# Patient Record
Sex: FEMALE | Race: WHITE
[De-identification: names, ages, dates, MRNs, and addresses within clinical notes are randomized per-mention and may not be internally consistent; named-entity substitution may affect disease eponyms.]

---

## 2017-06-05 ENCOUNTER — HOSPITAL ENCOUNTER (OUTPATIENT)
Dept: HOSPITAL 58 - RAD | Age: 67
Discharge: HOME | End: 2017-06-05
Attending: INTERNAL MEDICINE

## 2017-06-05 DIAGNOSIS — Z12.31: Primary | ICD-10-CM

## 2017-08-14 ENCOUNTER — HOSPITAL ENCOUNTER (OUTPATIENT)
Dept: HOSPITAL 58 - LAB | Age: 67
Discharge: HOME | End: 2017-08-14
Attending: INTERNAL MEDICINE

## 2017-08-14 DIAGNOSIS — I10: ICD-10-CM

## 2017-08-14 DIAGNOSIS — E78.5: Primary | ICD-10-CM

## 2017-08-14 LAB
ALBUMIN SERPL-MCNC: 3.7 G/DL (ref 3.4–5)
ALBUMIN/GLOB SERPL: 0.95 {RATIO}
ALP SERPL-CCNC: 82 U/L (ref 53–141)
ALT SERPL-CCNC: 22 U/L (ref 12–78)
ANION GAP SERPL CALC-SCNC: 13.6 MMOL/L
AST SERPL-CCNC: 14 U/L (ref 15–37)
BASOPHILS # BLD AUTO: 0.1 K/UL (ref 0–0.2)
BASOPHILS NFR BLD AUTO: 0.6 % (ref 0–3)
BILIRUB SERPL-MCNC: 0.75 MG/DL (ref 0–1.2)
BUN SERPL-MCNC: 10 MG/DL (ref 7–18)
BUN/CREAT SERPL: 10.52
CALCIUM SERPL-MCNC: 10 MG/DL (ref 8.2–10.2)
CHLORIDE SERPL-SCNC: 105 MMOL/L (ref 98–107)
CHOLEST SERPL-MCNC: 153 MG/DL (ref 0–200)
CHOLEST/HDLC SERPL: 3.2 {RATIO} (ref 4.5–5.5)
CO2 BLD-SCNC: 27 MMOL/L (ref 23–31)
CREAT SERPL-MCNC: 0.95 MG/DL (ref 0.6–1.3)
EOSINOPHIL # BLD AUTO: 0.2 K/UL (ref 0–0.7)
EOSINOPHIL NFR BLD AUTO: 2.1 % (ref 0–7)
GFR SERPLBLD BASED ON 1.73 SQ M-ARVRAT: 58 ML/MIN
GLOBULIN SER CALC-MCNC: 3.9 G/L
GLUCOSE SERPL-MCNC: 155 MG/DL (ref 82–115)
HCT VFR BLD AUTO: 41.2 % (ref 37–47)
HDLC SERPL-MCNC: 48 MG/DL (ref 35–80)
HGB BLD-MCNC: 13.4 G/DL (ref 12–16)
IMM GRANULOCYTES NFR BLD AUTO: 0.5 % (ref 0–5)
LYMPHOCYTES # SPEC AUTO: 1.4 K/UL (ref 0.6–3.4)
LYMPHOCYTES NFR BLD AUTO: 15.6 % (ref 10–50)
MCH RBC QN: 27.1 PG (ref 27–31)
MCHC RBC AUTO-ENTMCNC: 32.5 G/DL (ref 31.8–35.4)
MCV RBC: 83.2 FL (ref 81–99)
MONOCYTES # BLD AUTO: 0.6 K/UL (ref 0.4–2)
MONOCYTES NFR BLD AUTO: 6.6 % (ref 0–10)
NEUTROPHILS # BLD AUTO: 6.6 K/UL (ref 2–6.9)
NEUTROPHILS NFR BLD AUTO: 74.6 %
PLATELET # BLD AUTO: 284 10^3/UL (ref 140–440)
POTASSIUM SERPL-SCNC: 3.6 MMOL/L (ref 3.5–5.1)
PROT SERPL-MCNC: 7.6 G/DL (ref 5.8–8.1)
RBC # BLD AUTO: 4.95 10^6/UL (ref 4.2–5.4)
SODIUM SERPL-SCNC: 142 MMOL/L (ref 136–145)
TRIGL SERPL-MCNC: 141 MG/DL (ref 30–150)
VLDLC SERPL CALC-MCNC: 28 MG/DL (ref 2–30)
WBC # BLD AUTO: 8.85 K/UL (ref 4.6–10.2)

## 2017-08-14 PROCEDURE — 80053 COMPREHEN METABOLIC PANEL: CPT

## 2017-08-14 PROCEDURE — 80061 LIPID PANEL: CPT

## 2017-08-14 PROCEDURE — 85025 COMPLETE CBC W/AUTO DIFF WBC: CPT

## 2017-08-14 PROCEDURE — 84443 ASSAY THYROID STIM HORMONE: CPT

## 2017-08-14 PROCEDURE — 36415 COLL VENOUS BLD VENIPUNCTURE: CPT

## 2017-11-13 ENCOUNTER — HOSPITAL ENCOUNTER (OUTPATIENT)
Dept: HOSPITAL 58 - CAR | Age: 67
Discharge: HOME | End: 2017-11-13
Attending: INTERNAL MEDICINE

## 2017-11-13 DIAGNOSIS — E78.5: Primary | ICD-10-CM

## 2017-11-13 DIAGNOSIS — G47.30: ICD-10-CM

## 2017-11-13 DIAGNOSIS — I10: ICD-10-CM

## 2017-11-13 LAB
ALBUMIN SERPL-MCNC: 3.7 G/DL (ref 3.4–5)
ALBUMIN/GLOB SERPL: 0.86 {RATIO}
ALP SERPL-CCNC: 74 U/L (ref 53–141)
ALT SERPL-CCNC: 20 U/L (ref 12–78)
ANION GAP SERPL CALC-SCNC: 13.7 MMOL/L
AST SERPL-CCNC: 15 U/L (ref 15–37)
BASOPHILS # BLD AUTO: 0.1 K/UL (ref 0–0.2)
BASOPHILS NFR BLD AUTO: 0.5 % (ref 0–3)
BILIRUB SERPL-MCNC: 0.85 MG/DL (ref 0–1.2)
BUN SERPL-MCNC: 13 MG/DL (ref 7–18)
BUN/CREAT SERPL: 14.13
CALCIUM SERPL-MCNC: 10.1 MG/DL (ref 8.2–10.2)
CHLORIDE SERPL-SCNC: 105 MMOL/L (ref 98–107)
CHOLEST SERPL-MCNC: 157 MG/DL (ref 0–200)
CHOLEST/HDLC SERPL: 3.4 {RATIO} (ref 4.5–5.5)
CO2 BLD-SCNC: 27 MMOL/L (ref 23–31)
CREAT SERPL-MCNC: 0.92 MG/DL (ref 0.6–1.3)
EOSINOPHIL # BLD AUTO: 0.1 K/UL (ref 0–0.7)
EOSINOPHIL NFR BLD AUTO: 1.2 % (ref 0–7)
GFR SERPLBLD BASED ON 1.73 SQ M-ARVRAT: 61 ML/MIN
GLOBULIN SER CALC-MCNC: 4.3 G/L
GLUCOSE SERPL-MCNC: 115 MG/DL (ref 82–115)
HCT VFR BLD AUTO: 41.7 % (ref 37–47)
HDLC SERPL-MCNC: 46 MG/DL (ref 35–80)
HGB BLD-MCNC: 13.9 G/DL (ref 12–16)
IMM GRANULOCYTES NFR BLD AUTO: 0.6 % (ref 0–5)
LYMPHOCYTES # SPEC AUTO: 1.5 K/UL (ref 0.6–3.4)
LYMPHOCYTES NFR BLD AUTO: 13.8 % (ref 10–50)
MCH RBC QN: 27.6 PG (ref 27–31)
MCHC RBC AUTO-ENTMCNC: 33.3 G/DL (ref 31.8–35.4)
MCV RBC: 82.9 FL (ref 81–99)
MONOCYTES # BLD AUTO: 0.8 K/UL (ref 0.4–2)
MONOCYTES NFR BLD AUTO: 7.1 % (ref 0–10)
NEUTROPHILS # BLD AUTO: 8.4 K/UL (ref 2–6.9)
NEUTROPHILS NFR BLD AUTO: 76.8 %
PLATELET # BLD AUTO: 278 10^3/UL (ref 140–440)
POTASSIUM SERPL-SCNC: 3.7 MMOL/L (ref 3.5–5.1)
PROT SERPL-MCNC: 8 G/DL (ref 5.8–8.1)
RBC # BLD AUTO: 5.03 10^6/UL (ref 4.2–5.4)
SODIUM SERPL-SCNC: 142 MMOL/L (ref 136–145)
TRIGL SERPL-MCNC: 130 MG/DL (ref 30–150)
VLDLC SERPL CALC-MCNC: 26 MG/DL (ref 2–30)
WBC # BLD AUTO: 10.88 K/UL (ref 4.6–10.2)

## 2017-11-13 PROCEDURE — 80053 COMPREHEN METABOLIC PANEL: CPT

## 2017-11-13 PROCEDURE — 36415 COLL VENOUS BLD VENIPUNCTURE: CPT

## 2017-11-13 PROCEDURE — 93005 ELECTROCARDIOGRAM TRACING: CPT

## 2017-11-13 PROCEDURE — 93010 ELECTROCARDIOGRAM REPORT: CPT

## 2017-11-13 PROCEDURE — 80061 LIPID PANEL: CPT

## 2017-11-13 PROCEDURE — 84443 ASSAY THYROID STIM HORMONE: CPT

## 2017-11-13 PROCEDURE — 85025 COMPLETE CBC W/AUTO DIFF WBC: CPT

## 2017-12-18 ENCOUNTER — HOSPITAL ENCOUNTER (OUTPATIENT)
Dept: HOSPITAL 58 - CAR | Age: 67
Discharge: HOME | End: 2017-12-18
Attending: INTERNAL MEDICINE

## 2017-12-18 DIAGNOSIS — G47.30: Primary | ICD-10-CM

## 2018-02-14 ENCOUNTER — TELEPHONE (OUTPATIENT)
Dept: VASCULAR SURGERY | Facility: CLINIC | Age: 68
End: 2018-02-14

## 2018-02-14 NOTE — TELEPHONE ENCOUNTER
Called to remind Ms Beckwithing of her appointment tomorrow at 945 am with Dr Thakkar.     The number advised that it had been disconnected or no longer in service.

## 2018-02-21 ENCOUNTER — TELEPHONE (OUTPATIENT)
Dept: VASCULAR SURGERY | Facility: CLINIC | Age: 68
End: 2018-02-21

## 2018-02-21 NOTE — TELEPHONE ENCOUNTER
Called to remind Ms Ambrosio of her appointment tomorrow at 9 am with Dr Thakkra    No answer then came on and stated this number had been disconnected or is no longer in service.

## 2018-02-22 ENCOUNTER — OFFICE VISIT (OUTPATIENT)
Dept: VASCULAR SURGERY | Facility: CLINIC | Age: 68
End: 2018-02-22

## 2018-02-22 ENCOUNTER — OFFICE VISIT (OUTPATIENT)
Dept: CARDIOLOGY | Facility: CLINIC | Age: 68
End: 2018-02-22

## 2018-02-22 VITALS
OXYGEN SATURATION: 94 % | BODY MASS INDEX: 49.32 KG/M2 | WEIGHT: 268 LBS | HEART RATE: 79 BPM | DIASTOLIC BLOOD PRESSURE: 79 MMHG | HEIGHT: 62 IN | SYSTOLIC BLOOD PRESSURE: 140 MMHG

## 2018-02-22 VITALS
HEIGHT: 62 IN | HEART RATE: 67 BPM | DIASTOLIC BLOOD PRESSURE: 80 MMHG | BODY MASS INDEX: 49.32 KG/M2 | WEIGHT: 268 LBS | SYSTOLIC BLOOD PRESSURE: 128 MMHG | OXYGEN SATURATION: 94 % | RESPIRATION RATE: 18 BRPM

## 2018-02-22 DIAGNOSIS — I10 ESSENTIAL (PRIMARY) HYPERTENSION: ICD-10-CM

## 2018-02-22 DIAGNOSIS — M79.89 LEG SWELLING: ICD-10-CM

## 2018-02-22 DIAGNOSIS — E03.9 ACQUIRED HYPOTHYROIDISM: ICD-10-CM

## 2018-02-22 DIAGNOSIS — R06.09 DOE (DYSPNEA ON EXERTION): ICD-10-CM

## 2018-02-22 DIAGNOSIS — I10 ESSENTIAL HYPERTENSION: ICD-10-CM

## 2018-02-22 DIAGNOSIS — I89.0 LYMPHEDEMA: Primary | ICD-10-CM

## 2018-02-22 DIAGNOSIS — E66.01 MORBID OBESITY (HCC): Primary | ICD-10-CM

## 2018-02-22 PROCEDURE — 99204 OFFICE O/P NEW MOD 45 MIN: CPT | Performed by: INTERNAL MEDICINE

## 2018-02-22 PROCEDURE — 99204 OFFICE O/P NEW MOD 45 MIN: CPT | Performed by: SURGERY

## 2018-02-22 PROCEDURE — 93000 ELECTROCARDIOGRAM COMPLETE: CPT | Performed by: INTERNAL MEDICINE

## 2018-02-22 RX ORDER — CHLORAL HYDRATE 500 MG
1000 CAPSULE ORAL
COMMUNITY

## 2018-02-22 NOTE — PATIENT INSTRUCTIONS
Lymphedema  Lymphedema is swelling that is caused by the abnormal collection of lymph under the skin. Lymph is fluid from the tissues in your body that travels in the lymphatic system. This system is part of the immune system and includes lymph nodes and lymph vessels. The lymph vessels collect and carry the excess fluid, fats, proteins, and wastes from the tissues of the body to the bloodstream. This system also works to clean and remove bacteria and waste products from the body.  Lymphedema occurs when the lymphatic system is blocked. When the lymph vessels or lymph nodes are blocked or damaged, lymph does not drain properly, causing an abnormal buildup of lymph. This leads to swelling in the arms or legs. Lymphedema cannot be cured by medicines, but various methods can be used to help reduce the swelling.  What are the causes?  There are two types of lymphedema. Primary lymphedema is caused by the absence or abnormality of the lymph vessel at birth. Secondary lymphedema is more common. It occurs when the lymph vessel is damaged or blocked. Common causes of lymph vessel blockage include:  · Skin infection, such as cellulitis.  · Infection by parasites (filariasis).  · Injury.  · Cancer.  · Radiation therapy.  · Formation of scar tissue.  · Surgery.  What are the signs or symptoms?  Symptoms of this condition include:  · Swelling of the arm or leg.  · A heavy or tight feeling in the arm or leg.  · Swelling of the feet, toes, or fingers. Shoes or rings may fit more tightly than before.  · Redness of the skin over the affected area.  · Limited movement of the affected limb.  · Sensitivity to touch or discomfort in the affected limb.  How is this diagnosed?  This condition may be diagnosed with:  · A physical exam.  · Medical history.  · Bioimpedance spectroscopy. In this test, painless electrical currents are used to measure fluid levels in your body.  · Imaging tests, such as:  ¨ Lymphoscintigraphy. In this test, a  low dose of a radioactive substance is injected to trace the flow of lymph through the lymph vessels.  ¨ MRI.  ¨ CT scan.  ¨ Duplex ultrasound. This test uses sound waves to produce images of the vessels and the blood flow on a screen.  ¨ Lymphangiography. In this test, a contrast dye is injected into the lymph vessel to help show blockages.  How is this treated?  Treatment for this condition may depend on the cause. Treatment may include:  · Exercise. Certain exercises can help fluid move out of the affected limb.  · Massage. Gentle massage of the affected limb can help move the fluid out of the area.  · Compression. Various methods may be used to apply pressure to the affected limb in order to reduce the swelling.  ¨ Wearing compression stockings or sleeves on the affected limb.  ¨ Bandaging the affected limb.  ¨ Using an external pump that is attached to a sleeve that alternates between applying pressure and releasing pressure.  · Surgery. This is usually only done for severe cases. For example, surgery may be done if you have trouble moving the limb or if the swelling does not get better with other treatments.  If an underlying condition is causing the lymphedema, treatment for that condition is needed. For example, antibiotic medicines may be used to treat an infection.  Follow these instructions at home:  Activities   · Exercise regularly as directed by your health care provider.  · Do not sit with your legs crossed.  · When possible, keep the affected limb raised (elevated) above the level of your heart.  · Avoid carrying things with an arm that is affected by lymphedema.  · Remember that the affected area is more likely to become injured or infected.  · Take these steps to help prevent infection:  ¨ Keep the affected area clean and dry.  ¨ Protect your skin from cuts. For example, you should use gloves while cooking or gardening. Do not walk barefoot. If you shave the affected area, use an electric  razor.  General instructions   · Take medicines only as directed by your health care provider.  · Eat a healthy diet that includes a lot of fruits and vegetables.  · Do not wear tight clothes, shoes, or jewelry.  · Do not use heating pads over the affected area.  · Avoid having blood pressure checked on the affected limb.  · Keep all follow-up visits as directed by your health care provider. This is important.  Contact a health care provider if:  · You continue to have swelling in your limb.  · You have a fever.  · You have a cut that does not heal.  · You have redness or pain in the affected area.  · You have new swelling in your limb that comes on suddenly.  · You develop purplish spots or sores (lesions) on your limb.  Get help right away if:  · You have a skin rash.  · You have chills or sweats.  · You have shortness of breath.  This information is not intended to replace advice given to you by your health care provider. Make sure you discuss any questions you have with your health care provider.  Document Released: 10/14/2008 Document Revised: 08/24/2017 Document Reviewed: 11/25/2015  [employer] Interactive Patient Education © 2017 [employer]      How to Use Compression Stockings  Compression stockings are elastic socks that squeeze the legs. They help to increase blood flow to the legs, decrease swelling in the legs, and reduce the chance of developing blood clots in the lower legs. Compression stockings are often used by people who:  · Are recovering from surgery.  · Have poor circulation in their legs.  · Are prone to getting blood clots in their legs.  · Have varicose veins.  · Sit or stay in bed for long periods of time.  How to use compression stockings  Before you put on your compression stockings:  · Make sure that they are the correct size. If you do not know your size, ask your health care provider.  · Make sure that they are clean, dry, and in good condition.  · Check them for rips and tears. Do not  put them on if they are ripped or torn.  Put your stockings on first thing in the morning, before you get out of bed. Keep them on for as long as your health care provider advises. When you are wearing your stockings:  · Keep them as smooth as possible. Do not allow them to bunch up. It is especially important to prevent the stockings from bunching up around your toes or behind your knees.  · Do not roll the stockings downward and leave them rolled down. This can decrease blood flow to your leg.  · Change them right away if they become wet or dirty.  1.   When you take off your stockings, inspect your legs and feet. Anything that does not seem normal may require medical attention. Look for:  · Open sores.  · Red spots.  · Swelling.  Information and tips  · Do not stop wearing your compression stockings without talking to your health care provider first.  · Wash your stockings every day with mild detergent in cold or warm water. Do not use bleach. Air-dry your stockings or dry them in a clothes dryer on low heat.  · Replace your stockings every 3-6 months.  · If skin moisturizing is part of your treatment plan, apply lotion or cream at night so that your skin will be dry when you put on the stockings in the morning. It is harder to put the stockings on when you have lotion on your legs or feet.  Contact a health care provider if:  Remove your stockings and seek medical care if:  · You have a feeling of pins and needles in your feet or legs.  · You have any new changes in your skin.  · You have skin lesions that are getting worse.  · You have swelling or pain that is getting worse.  Get help right away if:  · You have numbness or tingling in your lower legs that does not get better right after you take the stockings off.  · Your toes or feet become cold and blue.  · You develop open sores or red spots on your legs that do not go away.  · You see or feel a warm spot on your leg.  · You have new swelling or soreness in  your leg.  · You are short of breath or you have chest pain for no reason.  · You have a rapid or irregular heartbeat.  · You feel light-headed or dizzy.  This information is not intended to replace advice given to you by your health care provider. Make sure you discuss any questions you have with your health care provider.  Document Released: 10/15/2010 Document Revised: 05/17/2017 Document Reviewed: 11/25/2015  [employer] Interactive Patient Education © 2017 [employer]

## 2018-02-22 NOTE — PROGRESS NOTES
02/22/2018      Louie Navas MD  [address] [place] [id number]    Lina Valente  1950    Chief Complaint   Patient presents with   • Establish Care     Adema in feet and ankles, wheezing, coughing and sob when active and denies any stroke symptoms.       Dear Louie Navas MD:      HPI  I had the pleasure of seeing your patient Lina Valente in the office today.  Thank you kindly for this consultation.  As you recall, Lina Valente is a 67 y.o.  female who you are currently following for Routine health maintenance.  She has had leg swelling for some time now.  She denies any pain, cramping at night when she sleeps, heaviness, or tiredness of the legs.  She does have some dry itchy skin with some potential for skin breakdown.  She denies any history of DVT.    Past Medical History:   Diagnosis Date   • Anemia    • Edema of both legs    • Hypertension    • Shortness of breath        Past Surgical History:   Procedure Laterality Date   • TUBAL ABDOMINAL LIGATION         Family History   Problem Relation Age of Onset   • Transient ischemic attack Mother    • Heart attack Father    • Alzheimer's disease Father        Social History     Social History   • Marital status: Unknown     Spouse name: N/A   • Number of children: N/A   • Years of education: N/A     Occupational History   • Not on file.     Social History Main Topics   • Smoking status: Never Smoker   • Smokeless tobacco: Never Used   • Alcohol use No   • Drug use: No   • Sexual activity: Defer     Other Topics Concern   • Not on file     Social History Narrative       No Known Allergies    Prior to Admission medications    Medication Sig Start Date End Date Taking? Authorizing Provider   aspirin 81 MG EC tablet Take 81 mg by mouth Daily.   Yes Historical Provider, MD   Calcium Carbonate Antacid (TUMS CALCIUM FOR LIFE BONE PO) Take 200 mg by mouth.   Yes Historical Provider, MD   esomeprazole (nexIUM) 20 MG capsule Take 20 mg by mouth  "Every Morning Before Breakfast.   Yes Historical Provider, MD   furosemide (LASIX) 20 MG tablet Take 20 mg by mouth Daily.   Yes Historical Provider, MD   levothyroxine (SYNTHROID, LEVOTHROID) 50 MCG tablet Take 50 mcg by mouth Daily.   Yes Historical Provider, MD   lisinopril (PRINIVIL,ZESTRIL) 20 MG tablet Take 20 mg by mouth Daily.   Yes Historical Provider, MD   metoprolol tartrate (LOPRESSOR) 25 MG tablet Take 25 mg by mouth.   Yes Historical Provider, MD   Multiple Vitamin (MULTI VITAMIN PO) Take  by mouth Daily.   Yes Historical Provider, MD   Omega-3 Fatty Acids (FISH OIL) 1000 MG capsule capsule Take 1,000 mg by mouth Daily With Breakfast.   Yes Historical Provider, MD   potassium chloride (KLOR-CON) 20 MEQ packet Take 20 mEq by mouth 2 (Two) Times a Day.   Yes Historical Provider, MD   simvastatin (ZOCOR) 10 MG tablet Take 10 mg by mouth Every Night.   Yes Historical Provider, MD   verapamil SR (CALAN-SR) 240 MG CR tablet Take 240 mg by mouth 2 (Two) Times a Day.   Yes Historical Provider, MD   hydrochlorothiazide (MICROZIDE) 12.5 MG capsule Take 12.5 mg by mouth Daily.  2/22/18  Historical Provider, MD   raNITIdine (ZANTAC) 150 MG tablet Take 150 mg by mouth 2 (Two) Times a Day.  2/22/18  Historical Provider, MD       Review of Systems   Constitutional: Negative.    HENT: Negative.    Eyes: Negative.    Respiratory: Positive for cough and shortness of breath.    Cardiovascular: Positive for leg swelling.   Gastrointestinal: Negative.    Endocrine: Negative.    Genitourinary: Negative.    Musculoskeletal: Negative.    Skin: Negative.    Allergic/Immunologic: Negative.    Neurological: Negative.    Hematological: Negative.    Psychiatric/Behavioral: Negative.        /80  Pulse 67  Resp 18  Ht 157.5 cm (62\")  Wt 122 kg (268 lb)  SpO2 94%  BMI 49.02 kg/m2  Physical Exam   Constitutional: She is oriented to person, place, and time. She appears well-developed and well-nourished. No distress.   obese "   HENT:   Head: Normocephalic and atraumatic.   Mouth/Throat: Oropharynx is clear and moist.   Eyes: Pupils are equal, round, and reactive to light. No scleral icterus.   Neck: Normal range of motion. Neck supple. No JVD present. Carotid bruit is not present. No thyromegaly present.   Cardiovascular: Normal rate, regular rhythm, S2 normal, normal heart sounds, intact distal pulses and normal pulses.  Exam reveals no gallop and no friction rub.    No murmur heard.  Lymphedema appearance to legs   Pulmonary/Chest: Effort normal and breath sounds normal.   Abdominal: Soft. Normal aorta and bowel sounds are normal. There is no hepatosplenomegaly.   obese   Musculoskeletal: Normal range of motion.   Neurological: She is alert and oriented to person, place, and time. No cranial nerve deficit.   Skin: Skin is warm and dry. She is not diaphoretic.   Psychiatric: She has a normal mood and affect. Her behavior is normal. Judgment and thought content normal.   Nursing note and vitals reviewed.          Patient Active Problem List   Diagnosis   • Morbid obesity   • Acquired hypothyroidism   • Essential hypertension   • NAGY (dyspnea on exertion)   • Hypertension   • Edema of both legs         ICD-10-CM ICD-9-CM   1. Lymphedema I89.0 457.1   2. Essential hypertension I10 401.9   3. Leg swelling M79.89 729.81       Lab Frequency Next Occurrence   CBC & Differential Once 2/27/2018   Comprehensive Metabolic Panel Once 2/27/2018   BNP Once 2/27/2018   Adult Stress Echo W/ Cont or Stress Agent if Necessary Per Protocol Once 2/27/2018   Adult Transthoracic Echo Complete W/ Cont if Necessary Per Protocol Once 3/1/2018   TSH Once 2/27/2018   Lipid Panel Once 2/27/2018       Plan: After thoroughly evaluating Lina Valente, I believe the best course of action is to remain conservative from a vascular standpoint.  She does have consistent appearance of lymphedema.  We will send her to lymphedema specialists.  We will also refer for  lymphedema pumps at home.  We also gave her a prescription for compression stockings to wear and instructed her how to use them on a daily basis.  I will see her back in 1 year's time for continued follow-up.  I did discuss vascular risk factors as they pertain to the progression of vascular disease including controlling hypertension.  The patient can continue taking her current medication regimen as previously planned.  This was all discussed in full with complete understanding.    Thank you for allowing me to participate in the care of your patient.  Please do not hesitate with any questions or concerns.  I will keep you aware of any further encounters with Lina Valente.        Sincerely yours,         DO Louie Maciel MD

## 2018-02-22 NOTE — PROGRESS NOTES
Lina Valente  [id number]  1950  67 y.o.  female    Referring Provider: Louie Navas MD    Reason for Follow-up Visit: Initial visit for evaluation of dyspnea on exertion       Subjective   Moderate exertional shortness of breath on exertion relieved with rest  Occasional cough  Intermittent wheezing  Leg selling  Going on for several months  No palpitations  No associated chest pain  No fever or chills  No significant expectoration  No hemoptysis  No presyncope or syncope   No chills or fevere  Compliant with medications        History of present illness:  Lina Valente is a 67 y.o. yo female with history of Essential Hypertension   x 1 year ago who presents today for   Chief Complaint   Patient presents with   • Hypertension     NEW PT   • Shortness of Breath   • Leg Swelling   .    History  Past Medical History:   Diagnosis Date   • Anemia    • Edema of both legs    • Hypertension    • Shortness of breath    ,   Past Surgical History:   Procedure Laterality Date   • TUBAL ABDOMINAL LIGATION     ,   Family History   Problem Relation Age of Onset   • No Known Problems Mother    • No Known Problems Father    ,   Social History   Substance Use Topics   • Smoking status: Never Smoker   • Smokeless tobacco: Never Used   • Alcohol use No   ,     Medications  Current Outpatient Prescriptions   Medication Sig Dispense Refill   • aspirin 81 MG EC tablet Take 81 mg by mouth Daily.     • Calcium Carbonate Antacid (TUMS CALCIUM FOR LIFE BONE PO) Take 200 mg by mouth.     • esomeprazole (nexIUM) 20 MG capsule Take 20 mg by mouth Every Morning Before Breakfast.     • furosemide (LASIX) 20 MG tablet Take 20 mg by mouth Daily.     • levothyroxine (SYNTHROID, LEVOTHROID) 50 MCG tablet Take 50 mcg by mouth Daily.     • lisinopril (PRINIVIL,ZESTRIL) 20 MG tablet Take 20 mg by mouth Daily.     • metoprolol tartrate (LOPRESSOR) 25 MG tablet Take 25 mg by mouth.     • Multiple Vitamin (MULTI VITAMIN PO) Take  by mouth  "Daily.     • Omega-3 Fatty Acids (FISH OIL) 1000 MG capsule capsule Take 1,000 mg by mouth Daily With Breakfast.     • potassium chloride (KLOR-CON) 20 MEQ packet Take 20 mEq by mouth 2 (Two) Times a Day.     • simvastatin (ZOCOR) 10 MG tablet Take 10 mg by mouth Every Night.     • verapamil SR (CALAN-SR) 240 MG CR tablet Take 240 mg by mouth 2 (Two) Times a Day.       No current facility-administered medications for this visit.        Allergies:  Review of patient's allergies indicates no known allergies.    Review of Systems  Review of Systems   Constitution: Positive for weakness and malaise/fatigue.   HENT: Negative.    Eyes: Negative.    Cardiovascular: Positive for dyspnea on exertion. Negative for chest pain, claudication, cyanosis, irregular heartbeat, leg swelling, near-syncope, orthopnea, palpitations, paroxysmal nocturnal dyspnea and syncope.   Respiratory: Negative.    Endocrine: Negative.    Hematologic/Lymphatic: Negative.    Skin: Negative.    Gastrointestinal: Negative for anorexia.   Genitourinary: Negative.    Psychiatric/Behavioral: Negative.        Objective     Physical Exam:  /79  Pulse 79  Ht 157.5 cm (62\")  Wt 122 kg (268 lb)  SpO2 94%  BMI 49.02 kg/m2  Physical Exam   Constitutional: She appears well-developed.   HENT:   Head: Normocephalic.   Neck: Normal carotid pulses and no JVD present. No tracheal tenderness present. Carotid bruit is not present. No tracheal deviation and no edema present.   Cardiovascular: Regular rhythm, normal heart sounds and normal pulses.    Pulmonary/Chest: Effort normal. No stridor.   Abdominal: Soft.   Neurological: She is alert. She has normal strength. No cranial nerve deficit or sensory deficit.   Skin: Skin is warm.   Psychiatric: She has a normal mood and affect. Her speech is normal and behavior is normal.       Results Review:       ECG 12 Lead  Date/Time: 2/22/2018 8:17 AM  Performed by: YIN BAXTER  Authorized by: YIN BAXTER   Comparison: " "not compared with previous ECG   Rhythm: sinus rhythm  Rate: normal  Conduction: conduction normal  ST Segments: ST segments normal  T Waves: T waves normal  QRS axis: normal  Other: no other findings  Clinical impression: normal ECG            Assessment/Plan   Lina was seen today for hypertension, shortness of breath and leg swelling.    Diagnoses and all orders for this visit:    Morbid obesity    Acquired hypothyroidism  -     TSH; Future    Essential hypertension    NAGY (dyspnea on exertion)  -     CBC & Differential; Future  -     Comprehensive Metabolic Panel; Future  -     BNP; Future  -     Adult Stress Echo W/ Cont or Stress Agent if Necessary Per Protocol; Future  -     Adult Transthoracic Echo Complete W/ Cont if Necessary Per Protocol; Future  -     Lipid Panel; Future    Essential (primary) hypertension   -     Lipid Panel; Future    Other orders  -     SCANNED - ECHOCARDIOGRAM  -     ECG 12 Lead         No significant pedal edema. Compliant with medications and diet. Latest labs and medications reviewed.    Plan:      Low salt/ HTN/ Heart healthy carbohydrate restricted cardiac diet as applicable to this patient's current diagnoses.   This handout has relevant information regarding shopping for food, preparing meals, what to eat at restaurants, tracking of food intake, information regarding sodium intake and salt content, how to read food labels, knowing what to eat, tips reagarding physical activity, calorie count and calorie expenditure. What foods to avoid. Information regarding alcoholic drinks along with \"good\" and \"bad\" fats.  Relevant printed educational materials given pertinent to above diagnoses    Weigh yourself frequently, at least weekly, preferably daily, call me if more than 2 pounds a day or 5 pounds a week weight gain  Orders Placed This Encounter   Procedures   • Comprehensive Metabolic Panel     Standing Status:   Future     Standing Expiration Date:   2/22/2019   • BNP     " Standing Status:   Future     Standing Expiration Date:   2/22/2019   • TSH     Standing Status:   Future     Standing Expiration Date:   2/22/2019   • Lipid Panel     Standing Status:   Future     Standing Expiration Date:   2/22/2019   • ECG 12 Lead     This order was created via procedure documentation   • SCANNED - ECHOCARDIOGRAM   • Adult Transthoracic Echo Complete W/ Cont if Necessary Per Protocol     Standing Status:   Future     Standing Expiration Date:   2/22/2019     Order Specific Question:   Reason for exam?     Answer:   Dyspnea   • CBC & Differential     Standing Status:   Future     Standing Expiration Date:   2/22/2019     Order Specific Question:   Manual Differential     Answer:   No   • Adult Stress Echo W/ Cont or Stress Agent if Necessary Per Protocol     Standing Status:   Future     Standing Expiration Date:   2/22/2019     Order Specific Question:   What stress agent will be used?     Answer:   Dobutamine     Order Specific Question:   Reason for Dobutamine?     Answer:   Unable to Exercise     Order Specific Question:   Reason for exam?     Answer:   Dyspnea   Gave a copy of my notes and relevant tests/ prior ECG etc for the patient to review and follow specific advise and relevant findings if any, prognosis, along with my current and future plans.   Close follow up with you as scheduled.  Intensive factor modifications.  See order list.    Recommend evaluation for obstructive sleep apnea given snoring and daytime somnolence and fatigue by primary provider  Keep LDL below 100 mg/dl. Monitor liver and renal functions.   Monitor CBC, CMP and Lipid Panel by primary   Counseled regarding disease appropriate diet, fluid, caffeine, stimulants and sodium intake as well as importance of compliance to diet, exercise and regular follow up.  Avoid NSAIDS and COX2 inhibitors. Use Acetaminophen PRN.  The patient was advised that NSAID-type medications have three  very important potential side effects:  cardiovascular complications, gastrointestinal irritation including hemorrhage and renal injuries.  Do not use anti-inflammatories such as Motrin/ibuprofen, Alleve/naprosyn, Mobic and like medications Use Tylenol instead.The patient expresses understanding of these issues and questions were answered.   Continue ECASA 81 mg daily regimen if applicable given risk factors and monitor for any signs of bleeding including red or dark stools. Fall precautions.      Return in about 4 weeks (around 3/22/2018).

## 2018-02-27 ENCOUNTER — RESULTS ENCOUNTER (OUTPATIENT)
Dept: CARDIOLOGY | Facility: CLINIC | Age: 68
End: 2018-02-27

## 2018-02-27 ENCOUNTER — HOSPITAL ENCOUNTER (OUTPATIENT)
Dept: CARDIOLOGY | Facility: HOSPITAL | Age: 68
Discharge: HOME | End: 2018-02-27
Attending: INTERNAL MEDICINE

## 2018-02-27 ENCOUNTER — HOSPITAL ENCOUNTER (OUTPATIENT)
Dept: CARDIOLOGY | Facility: HOSPITAL | Age: 68
Discharge: HOME | End: 2018-02-27
Attending: INTERNAL MEDICINE | Admitting: INTERNAL MEDICINE

## 2018-02-27 VITALS
HEIGHT: 62 IN | BODY MASS INDEX: 49.49 KG/M2 | HEART RATE: 60 BPM | DIASTOLIC BLOOD PRESSURE: 67 MMHG | WEIGHT: 268.96 LBS | SYSTOLIC BLOOD PRESSURE: 128 MMHG

## 2018-02-27 VITALS — SYSTOLIC BLOOD PRESSURE: 132 MMHG | DIASTOLIC BLOOD PRESSURE: 72 MMHG

## 2018-02-27 DIAGNOSIS — R06.09 DOE (DYSPNEA ON EXERTION): ICD-10-CM

## 2018-02-27 PROCEDURE — 93350 STRESS TTE ONLY: CPT

## 2018-02-27 PROCEDURE — 25010000002 PERFLUTREN 6.52 MG/ML SUSPENSION: Performed by: INTERNAL MEDICINE

## 2018-02-27 PROCEDURE — 93017 CV STRESS TEST TRACING ONLY: CPT

## 2018-02-27 PROCEDURE — 25010000003 DOBUTAMINE PER 250 MG: Performed by: INTERNAL MEDICINE

## 2018-02-27 PROCEDURE — 93350 STRESS TTE ONLY: CPT | Performed by: INTERNAL MEDICINE

## 2018-02-27 PROCEDURE — 93306 TTE W/DOPPLER COMPLETE: CPT | Performed by: INTERNAL MEDICINE

## 2018-02-27 PROCEDURE — 93352 ADMIN ECG CONTRAST AGENT: CPT | Performed by: INTERNAL MEDICINE

## 2018-02-27 PROCEDURE — 93018 CV STRESS TEST I&R ONLY: CPT | Performed by: INTERNAL MEDICINE

## 2018-02-27 PROCEDURE — 93306 TTE W/DOPPLER COMPLETE: CPT

## 2018-02-27 RX ORDER — DOBUTAMINE HYDROCHLORIDE 100 MG/100ML
10-50 INJECTION INTRAVENOUS CONTINUOUS
Status: DISCONTINUED | OUTPATIENT
Start: 2018-02-27 | End: 2018-02-28 | Stop reason: HOSPADM

## 2018-02-27 RX ADMIN — ATROPINE SULFATE 0.6 MG: 0.1 INJECTION, SOLUTION ENDOTRACHEAL; INTRAMUSCULAR; INTRAVENOUS; SUBCUTANEOUS at 08:19

## 2018-02-27 RX ADMIN — Medication 10 MCG/KG/MIN: at 08:10

## 2018-02-27 RX ADMIN — PERFLUTREN 8.48 MG: 6.52 INJECTION, SUSPENSION INTRAVENOUS at 08:10

## 2018-03-01 LAB
BH CV STRESS BP STAGE 1: NORMAL
BH CV STRESS BP STAGE 2: NORMAL
BH CV STRESS BP STAGE 3: NORMAL
BH CV STRESS DOB - ATROPINE STAGE 3: 0.6
BH CV STRESS DOSE DOBUTAMINE STAGE 1: 10
BH CV STRESS DOSE DOBUTAMINE STAGE 2: 20
BH CV STRESS DOSE DOBUTAMINE STAGE 3: 30
BH CV STRESS DURATION MIN STAGE 1: 3
BH CV STRESS DURATION MIN STAGE 2: 3
BH CV STRESS DURATION MIN STAGE 3: 4
BH CV STRESS DURATION SEC STAGE 1: 0
BH CV STRESS DURATION SEC STAGE 2: 0
BH CV STRESS DURATION SEC STAGE 3: 27
BH CV STRESS ECHO POST STRESS EJECTION FRACTION EF: 65 %
BH CV STRESS GRADE STAGE 1: 10
BH CV STRESS HR STAGE 1: 64
BH CV STRESS HR STAGE 2: 81
BH CV STRESS HR STAGE 3: 155
BH CV STRESS METS STAGE 1: 5
BH CV STRESS PROTOCOL 1: NORMAL
BH CV STRESS RECOVERY BP: NORMAL MMHG
BH CV STRESS RECOVERY HR: 96 BPM
BH CV STRESS SPEED STAGE 1: 1.7
BH CV STRESS STAGE 1: 1
BH CV STRESS STAGE 2: 2
BH CV STRESS STAGE 3: 3
LV EF 2D ECHO EST: 55 %
MAXIMAL PREDICTED HEART RATE: 152 BPM
PERCENT MAX PREDICTED HR: 101.97 %
STRESS BASELINE BP: NORMAL MMHG
STRESS BASELINE HR: 60 BPM
STRESS PERCENT HR: 120 %
STRESS POST EXERCISE DUR MIN: 10 MIN
STRESS POST EXERCISE DUR SEC: 27 SEC
STRESS POST PEAK BP: NORMAL MMHG
STRESS POST PEAK HR: 155 BPM
STRESS TARGET HR: 129 BPM

## 2018-03-02 LAB
BH CV ECHO MEAS - AO MAX PG (FULL): 1.1 MMHG
BH CV ECHO MEAS - AO MAX PG: 9.2 MMHG
BH CV ECHO MEAS - AO MEAN PG (FULL): 2 MMHG
BH CV ECHO MEAS - AO MEAN PG: 6 MMHG
BH CV ECHO MEAS - AO ROOT AREA (BSA CORRECTED): 1.4
BH CV ECHO MEAS - AO ROOT AREA: 7.5 CM^2
BH CV ECHO MEAS - AO ROOT DIAM: 3.1 CM
BH CV ECHO MEAS - AO V2 MAX: 152 CM/SEC
BH CV ECHO MEAS - AO V2 MEAN: 113 CM/SEC
BH CV ECHO MEAS - AO V2 VTI: 38.7 CM
BH CV ECHO MEAS - AVA(I,A): 2.3 CM^2
BH CV ECHO MEAS - AVA(I,D): 2.3 CM^2
BH CV ECHO MEAS - AVA(V,A): 2.4 CM^2
BH CV ECHO MEAS - AVA(V,D): 2.4 CM^2
BH CV ECHO MEAS - BSA(HAYCOCK): 2.4 M^2
BH CV ECHO MEAS - BSA: 2.2 M^2
BH CV ECHO MEAS - BZI_BMI: 49 KILOGRAMS/M^2
BH CV ECHO MEAS - BZI_METRIC_HEIGHT: 157.5 CM
BH CV ECHO MEAS - BZI_METRIC_WEIGHT: 121.6 KG
BH CV ECHO MEAS - CONTRAST EF 4CH: 66.1 ML/M^2
BH CV ECHO MEAS - EDV(CUBED): 140.6 ML
BH CV ECHO MEAS - EDV(MOD-SP4): 160 ML
BH CV ECHO MEAS - EDV(TEICH): 129.5 ML
BH CV ECHO MEAS - EF(CUBED): 86 %
BH CV ECHO MEAS - EF(MOD-SP4): 66.1 %
BH CV ECHO MEAS - EF(TEICH): 79.1 %
BH CV ECHO MEAS - ESV(CUBED): 19.7 ML
BH CV ECHO MEAS - ESV(MOD-SP4): 54.3 ML
BH CV ECHO MEAS - ESV(TEICH): 27 ML
BH CV ECHO MEAS - FS: 48.1 %
BH CV ECHO MEAS - IVS/LVPW: 1.1
BH CV ECHO MEAS - IVSD: 1 CM
BH CV ECHO MEAS - LA DIMENSION: 3.7 CM
BH CV ECHO MEAS - LA/AO: 1.2
BH CV ECHO MEAS - LAT PEAK E' VEL: 10.7 CM/SEC
BH CV ECHO MEAS - LV DIASTOLIC VOL/BSA (35-75): 73.9 ML/M^2
BH CV ECHO MEAS - LV MASS(C)D: 181.4 GRAMS
BH CV ECHO MEAS - LV MASS(C)DI: 83.8 GRAMS/M^2
BH CV ECHO MEAS - LV MAX PG: 8.2 MMHG
BH CV ECHO MEAS - LV MEAN PG: 4 MMHG
BH CV ECHO MEAS - LV SYSTOLIC VOL/BSA (12-30): 25.1 ML/M^2
BH CV ECHO MEAS - LV V1 MAX: 143 CM/SEC
BH CV ECHO MEAS - LV V1 MEAN: 89.4 CM/SEC
BH CV ECHO MEAS - LV V1 VTI: 35.1 CM
BH CV ECHO MEAS - LVIDD: 5.2 CM
BH CV ECHO MEAS - LVIDS: 2.7 CM
BH CV ECHO MEAS - LVLD AP4: 9.2 CM
BH CV ECHO MEAS - LVLS AP4: 6.8 CM
BH CV ECHO MEAS - LVOT AREA (M): 2.5 CM^2
BH CV ECHO MEAS - LVOT AREA: 2.5 CM^2
BH CV ECHO MEAS - LVOT DIAM: 1.8 CM
BH CV ECHO MEAS - LVPWD: 0.9 CM
BH CV ECHO MEAS - MED PEAK E' VEL: 6.31 CM/SEC
BH CV ECHO MEAS - MV A MAX VEL: 78.2 CM/SEC
BH CV ECHO MEAS - MV DEC TIME: 0.16 SEC
BH CV ECHO MEAS - MV E MAX VEL: 102 CM/SEC
BH CV ECHO MEAS - MV E/A: 1.3
BH CV ECHO MEAS - RAP SYSTOLE: 5 MMHG
BH CV ECHO MEAS - RVSP: 33.1 MMHG
BH CV ECHO MEAS - SI(AO): 134.9 ML/M^2
BH CV ECHO MEAS - SI(CUBED): 55.9 ML/M^2
BH CV ECHO MEAS - SI(LVOT): 41.3 ML/M^2
BH CV ECHO MEAS - SI(MOD-SP4): 48.8 ML/M^2
BH CV ECHO MEAS - SI(TEICH): 47.3 ML/M^2
BH CV ECHO MEAS - SV(AO): 292.1 ML
BH CV ECHO MEAS - SV(CUBED): 120.9 ML
BH CV ECHO MEAS - SV(LVOT): 89.3 ML
BH CV ECHO MEAS - SV(MOD-SP4): 105.7 ML
BH CV ECHO MEAS - SV(TEICH): 102.5 ML
BH CV ECHO MEAS - TR MAX VEL: 265 CM/SEC
E/E' RATIO: 16.2
LEFT ATRIUM VOLUME INDEX: 35.3 ML/M2
LEFT ATRIUM VOLUME: 76.2 CM3
LV EF 2D ECHO EST: 65 %
MAXIMAL PREDICTED HEART RATE: 152 BPM
STRESS TARGET HR: 129 BPM

## 2018-03-12 ENCOUNTER — HOSPITAL ENCOUNTER (OUTPATIENT)
Dept: HOSPITAL 58 - RHC-LAB | Age: 68
Discharge: HOME | End: 2018-03-12
Attending: INTERNAL MEDICINE

## 2018-03-12 DIAGNOSIS — E78.5: Primary | ICD-10-CM

## 2018-03-12 DIAGNOSIS — I10: ICD-10-CM

## 2018-03-12 PROCEDURE — 85025 COMPLETE CBC W/AUTO DIFF WBC: CPT

## 2018-03-12 PROCEDURE — 80061 LIPID PANEL: CPT

## 2018-03-12 PROCEDURE — 84443 ASSAY THYROID STIM HORMONE: CPT

## 2018-03-12 PROCEDURE — 80053 COMPREHEN METABOLIC PANEL: CPT

## 2018-03-12 PROCEDURE — 36415 COLL VENOUS BLD VENIPUNCTURE: CPT

## 2018-03-19 ENCOUNTER — HOSPITAL ENCOUNTER (OUTPATIENT)
Dept: PHYSICAL THERAPY | Facility: HOSPITAL | Age: 68
Setting detail: THERAPIES SERIES
Discharge: HOME | End: 2018-03-19

## 2018-03-19 ENCOUNTER — LAB (OUTPATIENT)
Dept: LAB | Facility: HOSPITAL | Age: 68
End: 2018-03-19
Attending: INTERNAL MEDICINE

## 2018-03-19 DIAGNOSIS — E03.9 ACQUIRED HYPOTHYROIDISM: ICD-10-CM

## 2018-03-19 DIAGNOSIS — I10 ESSENTIAL (PRIMARY) HYPERTENSION: ICD-10-CM

## 2018-03-19 DIAGNOSIS — Q82.0 HEREDITARY LYMPHEDEMA: Primary | ICD-10-CM

## 2018-03-19 DIAGNOSIS — I89.0 LYMPHEDEMA OF BOTH LOWER EXTREMITIES: ICD-10-CM

## 2018-03-19 DIAGNOSIS — R06.09 DOE (DYSPNEA ON EXERTION): ICD-10-CM

## 2018-03-19 LAB
ALBUMIN SERPL-MCNC: 4.3 G/DL (ref 3.5–5)
ALBUMIN/GLOB SERPL: 1.2 G/DL (ref 1.1–2.5)
ALP SERPL-CCNC: 76 U/L (ref 24–120)
ALT SERPL W P-5'-P-CCNC: 30 U/L (ref 0–54)
ANION GAP SERPL CALCULATED.3IONS-SCNC: 16 MMOL/L (ref 4–13)
ARTICHOKE IGE QN: 79 MG/DL (ref 0–99)
AST SERPL-CCNC: 27 U/L (ref 7–45)
BASOPHILS # BLD AUTO: 0.03 10*3/MM3 (ref 0–0.2)
BASOPHILS NFR BLD AUTO: 0.3 % (ref 0–2)
BILIRUB SERPL-MCNC: 0.7 MG/DL (ref 0.1–1)
BUN BLD-MCNC: 11 MG/DL (ref 5–21)
BUN/CREAT SERPL: 15.3 (ref 7–25)
CALCIUM SPEC-SCNC: 9 MG/DL (ref 8.4–10.4)
CHLORIDE SERPL-SCNC: 105 MMOL/L (ref 98–110)
CHOLEST SERPL-MCNC: 143 MG/DL (ref 130–200)
CO2 SERPL-SCNC: 25 MMOL/L (ref 24–31)
CREAT BLD-MCNC: 0.72 MG/DL (ref 0.5–1.4)
DEPRECATED RDW RBC AUTO: 46.3 FL (ref 40–54)
EOSINOPHIL # BLD AUTO: 0.16 10*3/MM3 (ref 0–0.7)
EOSINOPHIL NFR BLD AUTO: 1.6 % (ref 0–4)
ERYTHROCYTE [DISTWIDTH] IN BLOOD BY AUTOMATED COUNT: 15.5 % (ref 12–15)
GFR SERPL CREATININE-BSD FRML MDRD: 81 ML/MIN/1.73
GLOBULIN UR ELPH-MCNC: 3.6 GM/DL
GLUCOSE BLD-MCNC: 120 MG/DL (ref 70–100)
HCT VFR BLD AUTO: 41.4 % (ref 37–47)
HDLC SERPL-MCNC: 39 MG/DL
HGB BLD-MCNC: 13.6 G/DL (ref 12–16)
IMM GRANULOCYTES # BLD: 0.05 10*3/MM3 (ref 0–0.03)
IMM GRANULOCYTES NFR BLD: 0.5 % (ref 0–5)
LDLC/HDLC SERPL: 1.76 {RATIO}
LYMPHOCYTES # BLD AUTO: 1.56 10*3/MM3 (ref 0.72–4.86)
LYMPHOCYTES NFR BLD AUTO: 15.8 % (ref 15–45)
MCH RBC QN AUTO: 26.9 PG (ref 28–32)
MCHC RBC AUTO-ENTMCNC: 32.9 G/DL (ref 33–36)
MCV RBC AUTO: 81.8 FL (ref 82–98)
MONOCYTES # BLD AUTO: 0.76 10*3/MM3 (ref 0.19–1.3)
MONOCYTES NFR BLD AUTO: 7.7 % (ref 4–12)
NEUTROPHILS # BLD AUTO: 7.34 10*3/MM3 (ref 1.87–8.4)
NEUTROPHILS NFR BLD AUTO: 74.1 % (ref 39–78)
NRBC BLD MANUAL-RTO: 0 /100 WBC (ref 0–0)
PLATELET # BLD AUTO: 290 10*3/MM3 (ref 130–400)
PMV BLD AUTO: 12.4 FL (ref 6–12)
POTASSIUM BLD-SCNC: 3.9 MMOL/L (ref 3.5–5.3)
PROT SERPL-MCNC: 7.9 G/DL (ref 6.3–8.7)
RBC # BLD AUTO: 5.06 10*6/MM3 (ref 4.2–5.4)
SODIUM BLD-SCNC: 146 MMOL/L (ref 135–145)
TRIGL SERPL-MCNC: 177 MG/DL (ref 0–149)
TSH SERPL DL<=0.05 MIU/L-ACNC: 4.63 MIU/ML (ref 0.47–4.68)
WBC NRBC COR # BLD: 9.9 10*3/MM3 (ref 4.8–10.8)

## 2018-03-19 PROCEDURE — 97162 PT EVAL MOD COMPLEX 30 MIN: CPT | Performed by: PHYSICAL THERAPIST

## 2018-03-19 PROCEDURE — G8990 OTHER PT/OT CURRENT STATUS: HCPCS | Performed by: PHYSICAL THERAPIST

## 2018-03-19 PROCEDURE — 36415 COLL VENOUS BLD VENIPUNCTURE: CPT

## 2018-03-19 PROCEDURE — 84443 ASSAY THYROID STIM HORMONE: CPT | Performed by: INTERNAL MEDICINE

## 2018-03-19 PROCEDURE — 80053 COMPREHEN METABOLIC PANEL: CPT | Performed by: INTERNAL MEDICINE

## 2018-03-19 PROCEDURE — 80061 LIPID PANEL: CPT | Performed by: INTERNAL MEDICINE

## 2018-03-19 PROCEDURE — G8991 OTHER PT/OT GOAL STATUS: HCPCS | Performed by: PHYSICAL THERAPIST

## 2018-03-19 PROCEDURE — 85025 COMPLETE CBC W/AUTO DIFF WBC: CPT | Performed by: INTERNAL MEDICINE

## 2018-03-20 ENCOUNTER — TRANSCRIBE ORDERS (OUTPATIENT)
Dept: PHYSICAL THERAPY | Facility: HOSPITAL | Age: 68
End: 2018-03-20

## 2018-03-20 DIAGNOSIS — M54.40 LOW BACK PAIN WITH SCIATICA, SCIATICA LATERALITY UNSPECIFIED, UNSPECIFIED BACK PAIN LATERALITY, UNSPECIFIED CHRONICITY: ICD-10-CM

## 2018-03-20 DIAGNOSIS — M54.2 CERVICALGIA: ICD-10-CM

## 2018-03-20 DIAGNOSIS — M79.7 FIBROMYALGIA: Primary | ICD-10-CM

## 2018-03-23 ENCOUNTER — OFFICE VISIT (OUTPATIENT)
Dept: CARDIOLOGY | Facility: CLINIC | Age: 68
End: 2018-03-23

## 2018-03-23 VITALS
SYSTOLIC BLOOD PRESSURE: 144 MMHG | HEART RATE: 68 BPM | OXYGEN SATURATION: 95 % | WEIGHT: 264 LBS | DIASTOLIC BLOOD PRESSURE: 82 MMHG | HEIGHT: 62 IN | BODY MASS INDEX: 48.58 KG/M2

## 2018-03-23 DIAGNOSIS — G47.33 OSA (OBSTRUCTIVE SLEEP APNEA): ICD-10-CM

## 2018-03-23 DIAGNOSIS — R06.09 DOE (DYSPNEA ON EXERTION): ICD-10-CM

## 2018-03-23 DIAGNOSIS — E03.9 ACQUIRED HYPOTHYROIDISM: ICD-10-CM

## 2018-03-23 DIAGNOSIS — E66.01 MORBID OBESITY (HCC): ICD-10-CM

## 2018-03-23 DIAGNOSIS — I10 ESSENTIAL HYPERTENSION: Primary | ICD-10-CM

## 2018-03-23 DIAGNOSIS — R60.0 EDEMA OF BOTH LEGS: ICD-10-CM

## 2018-03-23 PROCEDURE — 99214 OFFICE O/P EST MOD 30 MIN: CPT | Performed by: INTERNAL MEDICINE

## 2018-03-23 NOTE — PROGRESS NOTES
Lina Valente  [id number]  1950  67 y.o.  female    Referring Provider: Louie Navas MD    Reason for Follow-up Visit: Here for follow up after cardiac testing.       Subjective   Similar symptoms as during last visit   Moderate exertional shortness of breath on exertion relieved with rest  Occasional cough  Intermittent wheezing  Leg selling  Going on for several months  No palpitations  No associated chest pain  No fever or chills  No significant expectoration  No hemoptysis  No presyncope or syncope   No chills or fevere  Compliant with medications  Recently diagnosed with obstructive sleep apnea and started treatment     History of present illness:  Lina Valente is a 67 y.o. yo female with history of Essential Hypertension   x 1 year ago who presents today for   Chief Complaint   Patient presents with   • Hypertension     1 MON FU   • NAGY   .    History  Past Medical History:   Diagnosis Date   • Anemia    • Edema of both legs    • Hypertension    • Shortness of breath    • Sleep apnea    ,   Past Surgical History:   Procedure Laterality Date   • TUBAL ABDOMINAL LIGATION     ,   Family History   Problem Relation Age of Onset   • Transient ischemic attack Mother    • Heart attack Father    • Alzheimer's disease Father    ,   Social History   Substance Use Topics   • Smoking status: Never Smoker   • Smokeless tobacco: Never Used   • Alcohol use No   ,     Medications  Current Outpatient Prescriptions   Medication Sig Dispense Refill   • aspirin 81 MG EC tablet Take 81 mg by mouth Daily.     • Calcium Carbonate Antacid (TUMS CALCIUM FOR LIFE BONE PO) Take 200 mg by mouth.     • esomeprazole (nexIUM) 20 MG capsule Take 20 mg by mouth Every Morning Before Breakfast.     • furosemide (LASIX) 20 MG tablet Take 20 mg by mouth Daily.     • levothyroxine (SYNTHROID, LEVOTHROID) 50 MCG tablet Take 50 mcg by mouth Daily.     • lisinopril (PRINIVIL,ZESTRIL) 20 MG tablet Take 20 mg by mouth Daily.     •  "metoprolol tartrate (LOPRESSOR) 25 MG tablet Take 25 mg by mouth.     • Multiple Vitamin (MULTI VITAMIN PO) Take  by mouth Daily.     • Omega-3 Fatty Acids (FISH OIL) 1000 MG capsule capsule Take 1,000 mg by mouth Daily With Breakfast.     • potassium chloride (KLOR-CON) 20 MEQ packet Take 20 mEq by mouth 2 (Two) Times a Day.     • simvastatin (ZOCOR) 10 MG tablet Take 10 mg by mouth Every Night.     • verapamil SR (CALAN-SR) 240 MG CR tablet Take 240 mg by mouth 2 (Two) Times a Day.       No current facility-administered medications for this visit.        Allergies:  Review of patient's allergies indicates no known allergies.    Review of Systems  Review of Systems   Constitution: Positive for weakness and malaise/fatigue.   HENT: Negative.    Eyes: Negative.    Cardiovascular: Positive for dyspnea on exertion. Negative for chest pain, claudication, cyanosis, irregular heartbeat, leg swelling, near-syncope, orthopnea, palpitations, paroxysmal nocturnal dyspnea and syncope.   Respiratory: Negative.    Endocrine: Negative.    Hematologic/Lymphatic: Negative.    Skin: Negative.    Gastrointestinal: Negative for anorexia.   Genitourinary: Negative.    Psychiatric/Behavioral: Negative.        Objective     Physical Exam:  /82   Pulse 68   Ht 157.5 cm (62.01\")   Wt 120 kg (264 lb)   SpO2 95%   BMI 48.27 kg/m²   Physical Exam   Constitutional: She appears well-developed.   HENT:   Head: Normocephalic.   Neck: Normal carotid pulses and no JVD present. No tracheal tenderness present. Carotid bruit is not present. No tracheal deviation and no edema present.   Cardiovascular: Regular rhythm, normal heart sounds and normal pulses.    Pulmonary/Chest: Effort normal. No stridor.   Abdominal: Soft.   Neurological: She is alert. She has normal strength. No cranial nerve deficit or sensory deficit.   Skin: Skin is warm.   Psychiatric: She has a normal mood and affect. Her speech is normal and behavior is normal. " "      Results Review:  Results for orders placed during the hospital encounter of 02/27/18   Adult Stress Echo W/ Cont or Stress Agent if Necessary Per Protocol    Narrative · Left ventricular systolic function is normal. Estimated EF = 55%.  · Normal stress echo with no significant echocardiographic evidence for   myocardial ischemia       Echo  · Left ventricular systolic function is normal. Estimated EF = 65%.  · Left ventricular diastolic dysfunction.     Procedures    Assessment/Plan   Lina was seen today for hypertension and nagy.    Diagnoses and all orders for this visit:    Essential hypertension    NAGY (dyspnea on exertion)    Morbid obesity    Acquired hypothyroidism    Edema of both legs    PAT (obstructive sleep apnea)         No significant pedal edema. Compliant with medications and diet. Latest labs and medications reviewed.    Plan:      Low salt/ HTN/ Heart healthy carbohydrate restricted cardiac diet as applicable to this patient's current diagnoses.   This handout has relevant information regarding shopping for food, preparing meals, what to eat at restaurants, tracking of food intake, information regarding sodium intake and salt content, how to read food labels, knowing what to eat, tips reagarding physical activity, calorie count and calorie expenditure. What foods to avoid. Information regarding alcoholic drinks along with \"good\" and \"bad\" fats.  Reiterated prior recommendations   Weigh yourself frequently, at least weekly, preferably daily, call me if more than 2 pounds a day or 5 pounds a week weight gain  Flexible diuretic dosing   Close follow up with you as scheduled.  Intensive factor modifications.  See order list.    Start treatment for obstructive sleep apnea   Recommend evaluation for obstructive sleep apnea given snoring and daytime somnolence and fatigue by primary provider  Keep LDL below 100 mg/dl. Monitor liver and renal functions.   Monitor CBC, CMP and Lipid Panel by primary "   Counseled regarding disease appropriate diet, fluid, caffeine, stimulants and sodium intake as well as importance of compliance to diet, exercise and regular follow up.  Avoid NSAIDS and COX2 inhibitors. Use Acetaminophen PRN.  The patient was advised that NSAID-type medications have three  very important potential side effects: cardiovascular complications, gastrointestinal irritation including hemorrhage and renal injuries.   Continue ECASA 81 mg daily regimen if applicable given risk factors and monitor for any signs of bleeding including red or dark stools. Fall precautions.     Do not use anti-inflammatories such as Motrin/ibuprofen, Alleve/naprosyn, Mobic and like medications Use Tylenol instead.The patient expresses understanding of these issues and questions were answered.   referral to bariatric clinic advised, she wants to wait  Gave a copy of my notes and relevant tests/ prior ECG etc for the patient to review and follow specific advise and relevant findings if any, prognosis, along with my current and future plans.     Return in about 6 months (around 9/23/2018).

## 2018-03-27 ENCOUNTER — HOSPITAL ENCOUNTER (OUTPATIENT)
Dept: PHYSICAL THERAPY | Facility: HOSPITAL | Age: 68
Setting detail: THERAPIES SERIES
Discharge: HOME | End: 2018-03-27

## 2018-03-27 DIAGNOSIS — I89.0 LYMPHEDEMA: ICD-10-CM

## 2018-03-27 PROCEDURE — 97140 MANUAL THERAPY 1/> REGIONS: CPT | Performed by: PHYSICAL THERAPIST

## 2018-03-27 NOTE — THERAPY TREATMENT NOTE
Outpatient Physical Therapy Lymphedema Treatment Note  [facility]     Patient Name: Lina Valente  : 1950  MRN: [id number]  Today's Date: 3/27/2018        Visit Date: 2018    Visit Dx:    ICD-10-CM ICD-9-CM   1. Lymphedema I89.0 457.1       Patient Active Problem List   Diagnosis   • Morbid obesity   • Acquired hypothyroidism   • Essential hypertension   • NAGY (dyspnea on exertion)   • Edema of both legs   • PAT (obstructive sleep apnea)              Lymphedema     Row Name 18 1500             Subjective Pain    Able to rate subjective pain? yes  -HR      Pre-Treatment Pain Level 0  -HR      Post-Treatment Pain Level 0  -HR         Manual Lymphatic Drainage    Manual Lymphatic Drainage initial sequence;opened regional lymph nodes;opened anastamoses;extremity treatment  -HR      Initial Sequence short neck  -HR      Opened Regional Lymph Nodes axillary;inguinal  -HR      Axillary right;left  -HR      Inguinal right;left  -HR      Opened Anastamoses inguino-axillary  -HR      Inguino-Axillary right;left  -HR      Extremity Treatment MLD to full limb  -HR      MLD to Full Limb BLE  -HR         Compression/Skin Care    Compression/Skin Care skin care;compression garment  -HR      Skin Care lotion applied  -HR      Compression Garment Comments After lotion dried, applied the knee high compression stockings she has been wearing.   -HR      Compression/Skin Care Comments She had a small pus-filled spot on the L shin that looks like an ingrown hair.   -HR        User Key  (r) = Recorded By, (t) = Taken By, (c) = Cosigned By    Initials Name Provider Type    HR Mahsa Dubois, PT DPT Physical Therapist                              PT Assessment/Plan     Row Name 18          PT Assessment    Assessment Comments She tolerated the MLD without any issues today. Gave her HEP to begin. Will see how she responds. Goal is to get her a pump for home.   -HR        PT Plan    PT Plan  Comments Cont current POC  -HR       User Key  (r) = Recorded By, (t) = Taken By, (c) = Cosigned By    Initials Name Provider Type    HR Mahsa Dubois, PT DPT Physical Therapist                     Exercises     Row Name 03/27/18 1500             Subjective Pain    Able to rate subjective pain? yes  -HR      Pre-Treatment Pain Level 0  -HR      Post-Treatment Pain Level 0  -HR        User Key  (r) = Recorded By, (t) = Taken By, (c) = Cosigned By    Initials Name Provider Type    HR Mahsa Dubois, PT DPT Physical Therapist                              PT OP Goals     Row Name 03/27/18 1500          PT Short Term Goals    STG 1 Patient and family will have a basic understanding of the condition of lymphedema and the care required for it.   -HR     STG 1 Progress Ongoing  -HR     STG 2 Patient will be independent with basic HEP for lymphatic drainage for BLEs.  -HR     STG 2 Progress Ongoing  -HR     STG 3 Patient and family will be independent with self massage at home for BLE lymphedema.  -HR     STG 3 Progress New  -HR        Long Term Goals    LTG 1 Patient and family will be independent with use of appropriate compression garments for BLE.  -HR     LTG 1 Progress Ongoing  -HR     LTG 1 Progress Comments Daughter looking into possibly getting a donning arita  -HR     LTG 2 Patient will receive a home pump and be able to use this daily.  -HR     LTG 2 Progress New  -HR     LTG 3 If patient needs advanced pump, this will be addressed after basic pump failure.   -HR     LTG 3 Progress New  -HR     LTG 4 Patient will understand importance of following low fat, low sugar diet.  -HR     LTG 4 Progress New  -HR        Time Calculation    PT Goal Re-Cert Due Date 04/18/18  -HR       User Key  (r) = Recorded By, (t) = Taken By, (c) = Cosigned By    Initials Name Provider Type    GAYLA Dubois, PT DPT Physical Therapist          Therapy Education  Education Details: skin care- watch for friction  irritation on the anterior shins  Given: Edema management  Program: Reinforced  How Provided: Verbal, Demonstration  Provided to: Patient, Caregiver  Level of Understanding: Verbalized              Time Calculation:   Start Time: 1420  Stop Time: 1530  Time Calculation (min): 70 min  Total Timed Code Minutes- PT: 70 minute(s)     Therapy Charges for Today     Code Description Service Date Service Provider Modifiers Qty    42372741870 HC PT MANUAL THERAPY EA 15 MIN 3/27/2018 Mahsa Dubois, PT DPT GP 5                    Mahsa Dubois, PT DPT  3/27/2018

## 2018-03-30 ENCOUNTER — HOSPITAL ENCOUNTER (OUTPATIENT)
Dept: PHYSICAL THERAPY | Facility: HOSPITAL | Age: 68
Setting detail: THERAPIES SERIES
Discharge: HOME | End: 2018-03-30

## 2018-03-30 DIAGNOSIS — I89.0 LYMPHEDEMA OF BOTH LOWER EXTREMITIES: Primary | ICD-10-CM

## 2018-03-30 PROCEDURE — 97140 MANUAL THERAPY 1/> REGIONS: CPT | Performed by: PHYSICAL THERAPIST

## 2018-03-30 NOTE — THERAPY TREATMENT NOTE
Outpatient Physical Therapy Lymphedema Treatment Note  [facility]     Patient Name: Lina Valente  : 1950  MRN: [id number]  Today's Date: 3/30/2018        Visit Date: 2018    Visit Dx:    ICD-10-CM ICD-9-CM   1. Lymphedema of both lower extremities I89.0 457.1       Patient Active Problem List   Diagnosis   • Morbid obesity   • Acquired hypothyroidism   • Essential hypertension   • NAGY (dyspnea on exertion)   • Edema of both legs   • PAT (obstructive sleep apnea)              Lymphedema     Row Name 18 1200 18 1100          Subjective Pain    Able to rate subjective pain? yes  -HR  --     Pre-Treatment Pain Level 0  -HR  --     Post-Treatment Pain Level 0  -HR  --        LLE Circumferential (cm)    Left 1  -- 23.4 cm  -HR     Left 2  -- 30.4 cm  -HR     Left 3  -- 32.9 cm  -HR     Left 4  -- 42.7 cm  -HR     Left 5  -- 45 cm  -HR     Left 6  -- 55.3 cm  -HR        RLE Circumferential (cm)    Right 1  -- 22 cm  -HR     Right 2  -- 28 cm  -HR     Right 3  -- 33.4 cm  -HR     Right 4  -- 43.5 cm  -HR     Right 5  -- 44.8 cm  -HR     Right 6  -- 56.3 cm  -HR        Manual Lymphatic Drainage    Manual Lymphatic Drainage initial sequence;opened regional lymph nodes;opened anastamoses;extremity treatment  -HR  --     Initial Sequence short neck  -HR  --     Opened Regional Lymph Nodes axillary;inguinal  -HR  --     Axillary right;left  -HR  --     Inguinal right;left  -HR  --     Opened Anastamoses inguino-axillary  -HR  --     Inguino-Axillary right;left  -HR  --     Extremity Treatment MLD to full limb  -HR  --        Compression/Skin Care    Compression/Skin Care skin care;compression garment  -HR  --     Skin Care lotion applied  -HR  --     Compression Garment Comments She is wearing a pair of Large stockings now instead of X-Large  -HR  --       User Key  (r) = Recorded By, (t) = Taken By, (c) = Cosigned By    Initials Name Provider Type    HR Mahsa Dubois, PT DPT Physical  Therapist                              PT Assessment/Plan     Row Name 03/30/18 1200          PT Assessment    Assessment Comments She has been able to go down a size in the stockings. She has had more reduction in the L lower leg than the right. Her daughter reports that the L was much worse than the R when this all started.   -HR        PT Plan    PT Plan Comments Cont current POC. Anticipate possibility of basic pump after 4 weeks.   -HR       User Key  (r) = Recorded By, (t) = Taken By, (c) = Cosigned By    Initials Name Provider Type    GAYLA Dubois, PT DPT Physical Therapist                     Exercises     Row Name 03/30/18 1200             Subjective Pain    Able to rate subjective pain? yes  -HR      Pre-Treatment Pain Level 0  -HR      Post-Treatment Pain Level 0  -HR        User Key  (r) = Recorded By, (t) = Taken By, (c) = Cosigned By    Initials Name Provider Type    GAYLA Dubois, PT DPT Physical Therapist                              PT OP Goals     Row Name 03/30/18 1200          PT Short Term Goals    STG 1 Patient and family will have a basic understanding of the condition of lymphedema and the care required for it.   -HR     STG 1 Progress Ongoing  -HR     STG 2 Patient will be independent with basic HEP for lymphatic drainage for BLEs.  -HR     STG 2 Progress Ongoing  -HR     STG 3 Patient and family will be independent with self massage at home for BLE lymphedema.  -HR     STG 3 Progress New  -HR        Long Term Goals    LTG 1 Patient and family will be independent with use of appropriate compression garments for BLE.  -HR     LTG 1 Progress Ongoing  -HR     LTG 2 Patient will receive a home pump and be able to use this daily.  -HR     LTG 2 Progress New  -HR     LTG 3 If patient needs advanced pump, this will be addressed after basic pump failure.   -HR     LTG 3 Progress New  -HR     LTG 4 Patient will understand importance of following low fat, low sugar diet.  -HR      LTG 4 Progress New  -HR        Time Calculation    PT Goal Re-Cert Due Date 04/18/18  -HR       User Key  (r) = Recorded By, (t) = Taken By, (c) = Cosigned By    Initials Name Provider Type    HR Mahsa Dubois, PT DPT Physical Therapist          Therapy Education  Education Details: watch rashy look on skin of L leg.   Given: Edema management, HEP  Program: Reinforced  How Provided: Verbal  Provided to: Patient, Caregiver  Level of Understanding: Verbalized              Time Calculation:   Start Time: 1055  Stop Time: 1200  Time Calculation (min): 65 min  Total Timed Code Minutes- PT: 65 minute(s)     Therapy Charges for Today     Code Description Service Date Service Provider Modifiers Qty    16547418243 HC PT MANUAL THERAPY EA 15 MIN 3/30/2018 Mahsa Dubois, PT DPT GP 4                    Mahsa Dubois, PT DPT  3/30/2018

## 2018-04-02 ENCOUNTER — HOSPITAL ENCOUNTER (OUTPATIENT)
Dept: PHYSICAL THERAPY | Facility: HOSPITAL | Age: 68
Setting detail: THERAPIES SERIES
Discharge: HOME | End: 2018-04-02

## 2018-04-02 DIAGNOSIS — I89.0 LYMPHEDEMA OF BOTH LOWER EXTREMITIES: Primary | ICD-10-CM

## 2018-04-02 PROCEDURE — 97140 MANUAL THERAPY 1/> REGIONS: CPT

## 2018-04-02 NOTE — THERAPY TREATMENT NOTE
Outpatient Physical Therapy Lymphedema Treatment Note  [facility]     Patient Name: Lina Valente  : 1950  MRN: [id number]  Today's Date: 2018        Visit Date: 2018    Visit Dx:    ICD-10-CM ICD-9-CM   1. Lymphedema of both lower extremities I89.0 457.1       Patient Active Problem List   Diagnosis   • Morbid obesity   • Acquired hypothyroidism   • Essential hypertension   • NAGY (dyspnea on exertion)   • Edema of both legs   • PAT (obstructive sleep apnea)              Lymphedema     Row Name 18 1415             Subjective Pain    Able to rate subjective pain? yes  -AL      Pre-Treatment Pain Level 0  -AL      Post-Treatment Pain Level 0  -AL         Subjective Comments    Subjective Comments She is wearing the size large stockings today, she just got them last week.   She is not having any pain from these, her daughter helps get them on and off.    -AL         Manual Lymphatic Drainage    Manual Lymphatic Drainage initial sequence;opened regional lymph nodes;opened anastamoses;extremity treatment  -AL      Initial Sequence short neck  -AL      Abdomen superficial  -AL      Diaphragmatic Breathing x 9 with superficial abdominals  -AL      Opened Regional Lymph Nodes axillary;inguinal  -AL      Axillary right;left  -AL      Inguinal right;left  -AL      Opened Anastamoses inguino-axillary  -AL      Inguino-Axillary right;left  -AL      Extremity Treatment MLD to full limb  -AL      MLD to Full Limb B LE  -AL      Manual Lymphatic Drainage Comments Instructed on self MLD, went over HEP  -AL         Compression/Skin Care    Compression/Skin Care skin care;compression garment  -AL      Skin Care lotion applied  -AL      Compression Garment Comments After lotion dried, applied the large knee high compression stockings.  -AL        User Key  (r) = Recorded By, (t) = Taken By, (c) = Cosigned By    Initials Name Provider Type    LEXIS Courtney PTA Physical Therapy Assistant                               PT Assessment/Plan     Row Name 04/02/18 1415          PT Assessment    Assessment Comments Patient is pleased with the progress she has made so far.  She is going to start working on the self MLD.  Patient is compliant with the HEP.  -AL        PT Plan    PT Plan Comments Continue with MLD and compression.  -AL       User Key  (r) = Recorded By, (t) = Taken By, (c) = Cosigned By    Initials Name Provider Type    LEXIS Courtney PTA Physical Therapy Assistant                     Exercises     Row Name 04/02/18 1417             Subjective Comments    Subjective Comments She is wearing the size large stockings today, she just got them last week.   She is not having any pain from these, her daughter helps get them on and off.    -AL         Subjective Pain    Able to rate subjective pain? yes  -AL      Pre-Treatment Pain Level 0  -AL      Post-Treatment Pain Level 0  -AL        User Key  (r) = Recorded By, (t) = Taken By, (c) = Cosigned By    Initials Name Provider Type    LEXIS Courtney PTA Physical Therapy Assistant                              PT OP Goals     Row Name 04/02/18 1415          PT Short Term Goals    STG 1 Patient and family will have a basic understanding of the condition of lymphedema and the care required for it.   -AL     STG 1 Progress Ongoing  -AL     STG 1 Progress Comments Educated during treatment.  -AL     STG 2 Patient will be independent with basic HEP for lymphatic drainage for BLEs.  -AL     STG 2 Progress Ongoing  -AL     STG 2 Progress Comments She is working on the HEP  -AL     STG 3 Patient and family will be independent with self massage at home for BLE lymphedema.  -AL     STG 3 Progress Ongoing  -AL     STG 3 Progress Comments Instructed on self MLD today  -AL        Long Term Goals    LTG 1 Patient and family will be independent with use of appropriate compression garments for BLE.  -AL     LTG 1 Progress Ongoing  -AL     LTG 1 Progress Comments Her  daughter helps her don/doff the compression stockings  -AL     LTG 2 Patient will receive a home pump and be able to use this daily.  -AL     LTG 2 Progress New  -AL     LTG 3 If patient needs advanced pump, this will be addressed after basic pump failure.   -AL     LTG 3 Progress New  -AL     LTG 4 Patient will understand importance of following low fat, low sugar diet.  -AL     LTG 4 Progress New  -AL        Time Calculation    PT Goal Re-Cert Due Date 04/18/18  -AL       User Key  (r) = Recorded By, (t) = Taken By, (c) = Cosigned By    Initials Name Provider Type    LEXIS Courtney PTA Physical Therapy Assistant          Therapy Education  Education Details: Work on self MLD  Given: Edema management  Program: New  How Provided: Verbal, Demonstration, Written  Provided to: Patient  Level of Understanding: Teach back education performed, Verbalized, Demonstrated              Time Calculation:   Start Time: 1415  Stop Time: 1530  Time Calculation (min): 75 min  Total Timed Code Minutes- PT: 75 minute(s)     Therapy Charges for Today     Code Description Service Date Service Provider Modifiers Qty    51676131771 HC PT MANUAL THERAPY EA 15 MIN 4/2/2018 Christina Courtney PTA GP 5                    Christina Courtney PTA  4/2/2018

## 2018-04-05 ENCOUNTER — HOSPITAL ENCOUNTER (OUTPATIENT)
Dept: PHYSICAL THERAPY | Facility: HOSPITAL | Age: 68
Setting detail: THERAPIES SERIES
Discharge: HOME | End: 2018-04-05

## 2018-04-05 DIAGNOSIS — I89.0 LYMPHEDEMA OF BOTH LOWER EXTREMITIES: Primary | ICD-10-CM

## 2018-04-05 PROCEDURE — 97140 MANUAL THERAPY 1/> REGIONS: CPT

## 2018-04-05 NOTE — THERAPY TREATMENT NOTE
Outpatient Physical Therapy Lymphedema Treatment Note  [facility]     Patient Name: Lina Valente  : 1950  MRN: [id number]  Today's Date: 2018        Visit Date: 2018    Visit Dx:    ICD-10-CM ICD-9-CM   1. Lymphedema of both lower extremities I89.0 457.1       Patient Active Problem List   Diagnosis   • Morbid obesity   • Acquired hypothyroidism   • Essential hypertension   • NAGY (dyspnea on exertion)   • Edema of both legs   • PAT (obstructive sleep apnea)              Lymphedema     Row Name 18 0922             Subjective Pain    Able to rate subjective pain? yes  -AL      Pre-Treatment Pain Level 0  -AL      Post-Treatment Pain Level 0  -AL         Subjective Comments    Subjective Comments She did fine after her last treatment.  Her stockings are still fitting good.  She started working on the HEP and MLD.  She states she feels like she has fluid in the abdominal area.   -AL         Lymphedema Measurements    Measurement Type(s) Circumferential  -AL      Circumferential Areas Lower extremities  -AL         LLE Circumferential (cm)    Measurement Location 1 base of toes  -AL      Left 1 22 cm  -AL      Measurement Location 2 +12  -AL      Left 2 28.3 cm  -AL      Measurement Location 3 +10  -AL      Left 3 33.2 cm  -AL      Measurement Location 4 +10  -AL      Left 4 42.5 cm  -AL      Measurement Location 5 +10  -AL      Left 5 43 cm  -AL      Measurement Location 6 +10  -AL      Left 6 57.3 cm  -AL         RLE Circumferential (cm)    Measurement Location 1 base of toes  -AL      Right 1 21.6 cm  -AL      Measurement Location 2 +12  -AL      Right 2 27.8 cm  -AL      Measurement Location 3 +10  -AL      Right 3 30.8 cm  -AL      Measurement Location 4 +10  -AL      Right 4 42.2 cm  -AL      Measurement Location 5 +10  -AL      Right 5 44.1 cm  -AL      Measurement Location 6 +10  -AL      Right 6 56.9 cm  -AL         Manual Lymphatic Drainage    Manual Lymphatic Drainage initial  sequence;opened regional lymph nodes;opened anastamoses;extremity treatment  -AL      Initial Sequence short neck  -AL      Abdomen superficial  -AL      Diaphragmatic Breathing x 9 with superficial abdominals  -AL      Opened Regional Lymph Nodes axillary;inguinal  -AL      Axillary right;left  -AL      Inguinal right;left  -AL      Opened Anastamoses inguino-axillary  -AL      Inguino-Axillary right;left  -AL      Extremity Treatment MLD to full limb  -AL      MLD to Full Limb B LE  -AL      Manual Lymphatic Drainage Comments Continue with HEP and self MLD  -AL         Compression/Skin Care    Compression/Skin Care skin care;compression garment  -AL      Skin Care lotion applied  -AL      Compression Garment Comments After lotion dried, applied the large knee high compression stockings.  -AL        User Key  (r) = Recorded By, (t) = Taken By, (c) = Cosigned By    Initials Name Provider Type    LEXIS Courtney PTA Physical Therapy Assistant                              PT Assessment/Plan     Row Name 04/05/18 0982          PT Assessment    Assessment Comments Patient has had a decrease in the size of both lower legs, she and increase in proximal edeama on the L lower extremity.  She is going to work on the MLD more on both thigh areas.  She feels like she has fluid in her abdomen today.  Her compression stockings are still fitting good.  -AL        PT Plan    PT Plan Comments Continue with plan of care.   -AL       User Key  (r) = Recorded By, (t) = Taken By, (c) = Cosigned By    Initials Name Provider Type    LEXIS Courtney PTA Physical Therapy Assistant                     Exercises     Row Name 04/05/18 6216             Subjective Comments    Subjective Comments She did fine after her last treatment.  Her stockings are still fitting good.  She started working on the HEP and MLD.  She states she feels like she has fluid in the abdominal area.   -AL         Subjective Pain    Able to rate subjective pain? yes   -AL      Pre-Treatment Pain Level 0  -AL      Post-Treatment Pain Level 0  -AL        User Key  (r) = Recorded By, (t) = Taken By, (c) = Cosigned By    Initials Name Provider Type    LEXIS Courtney PTA Physical Therapy Assistant                              PT OP Goals     Row Name 04/05/18 0922          PT Short Term Goals    STG 1 Patient and family will have a basic understanding of the condition of lymphedema and the care required for it.   -AL     STG 1 Progress Ongoing  -AL     STG 1 Progress Comments Continue to educate  -AL     STG 2 Patient will be independent with basic HEP for lymphatic drainage for BLEs.  -AL     STG 2 Progress Ongoing  -AL     STG 2 Progress Comments She is working on the HEP  -AL     STG 3 Patient and family will be independent with self massage at home for BLE lymphedema.  -AL     STG 3 Progress Ongoing  -AL     STG 3 Progress Comments She is working on the MLD  -AL        Long Term Goals    LTG 1 Patient and family will be independent with use of appropriate compression garments for BLE.  -AL     LTG 1 Progress Ongoing  -AL     LTG 1 Progress Comments Her family helps her don the compression stockings.  -AL     LTG 2 Patient will receive a home pump and be able to use this daily.  -AL     LTG 2 Progress New  -AL     LTG 3 If patient needs advanced pump, this will be addressed after basic pump failure.   -AL     LTG 3 Progress New  -AL     LTG 4 Patient will understand importance of following low fat, low sugar diet.  -AL     LTG 4 Progress Ongoing  -AL     LTG 4 Progress Comments She is working on a low salt low fat diet.  She has a goal to lose 20 pounds.  -AL        Time Calculation    PT Goal Re-Cert Due Date 04/18/18  -AL       User Key  (r) = Recorded By, (t) = Taken By, (c) = Cosigned By    Initials Name Provider Type    LEXIS Courtney PTA Physical Therapy Assistant          Therapy Education  Education Details: Continue with HEP, manual lymphatic drainage, and wear  compression daily.  Given: Edema management  Program: Reinforced  How Provided: Verbal  Provided to: Patient  Level of Understanding: Verbalized              Time Calculation:   Start Time: 0922  Stop Time: 1032  Time Calculation (min): 70 min  Total Timed Code Minutes- PT: 70 minute(s)     Therapy Charges for Today     Code Description Service Date Service Provider Modifiers Qty    83173259289 HC PT MANUAL THERAPY EA 15 MIN 4/5/2018 Christina Courtney, PTA GP 5                    Christina Courtney PTA  4/5/2018

## 2018-04-09 ENCOUNTER — HOSPITAL ENCOUNTER (OUTPATIENT)
Dept: PHYSICAL THERAPY | Facility: HOSPITAL | Age: 68
Setting detail: THERAPIES SERIES
Discharge: HOME | End: 2018-04-09

## 2018-04-09 DIAGNOSIS — I89.0 LYMPHEDEMA OF BOTH LOWER EXTREMITIES: Primary | ICD-10-CM

## 2018-04-09 PROCEDURE — 97140 MANUAL THERAPY 1/> REGIONS: CPT

## 2018-04-09 NOTE — THERAPY TREATMENT NOTE
Outpatient Physical Therapy Lymphedema Treatment Note  [facility]     Patient Name: Lina Valente  : 1950  MRN: [id number]  Today's Date: 2018        Visit Date: 2018    Visit Dx:    ICD-10-CM ICD-9-CM   1. Lymphedema of both lower extremities I89.0 457.1       Patient Active Problem List   Diagnosis   • Morbid obesity   • Acquired hypothyroidism   • Essential hypertension   • NAGY (dyspnea on exertion)   • Edema of both legs   • PAT (obstructive sleep apnea)              Lymphedema     Row Name 18 0805             Subjective Pain    Able to rate subjective pain? yes  -AL      Pre-Treatment Pain Level 0  -AL      Post-Treatment Pain Level 0  -AL         Subjective Comments    Subjective Comments No new problems, her stockings are still fitting good.  She still feels like she has an increase in fluid in the abdominal area.   -AL         Lymphedema Measurements    Measurement Type(s) Circumferential  -AL      Circumferential Areas --   Abdomen  -AL      Lymphedema Measurements Comments Abdomen at waist:  119.6 cm  -AL         Manual Lymphatic Drainage    Manual Lymphatic Drainage initial sequence;opened regional lymph nodes;opened anastamoses;extremity treatment  -AL      Initial Sequence short neck  -AL      Abdomen superficial  -AL      Diaphragmatic Breathing x 9 with superficial abdominals  -AL      Opened Regional Lymph Nodes axillary;inguinal  -AL      Axillary right;left  -AL      Inguinal right;left  -AL      Opened Anastamoses inguino-axillary  -AL      Inguino-Axillary right;left  -AL      Extremity Treatment MLD to full limb  -AL      MLD to Full Limb B LE  -AL      Manual Lymphatic Drainage Comments Work on MLD especially to thigh area.  -AL         Compression/Skin Care    Compression/Skin Care compression garment  -AL      Skin Care --  -AL      Compression Garment Comments Applied the large knee high compression stockings.  -AL        User Key  (r) = Recorded By, (t) =  Taken By, (c) = Cosigned By    Initials Name Provider Type    AL Christinaeve Courtney PTA Physical Therapy Assistant                              PT Assessment/Plan     Row Name 04/09/18 0805          PT Assessment    Assessment Comments Patient is compliant with the daily wear of the stockings, she is working on the HEP and MLD every other day.  She needs encouragement to work more on the low salt low fat diet.  Will measure the LE's at her next visit.   -AL        PT Plan    PT Plan Comments Continue with POC  -AL       User Key  (r) = Recorded By, (t) = Taken By, (c) = Cosigned By    Initials Name Provider Type    LEXIS Vasquez JUAN Courtney PTA Physical Therapy Assistant                     Exercises     Row Name 04/09/18 0805             Subjective Comments    Subjective Comments No new problems, her stockings are still fitting good.  She still feels like she has an increase in fluid in the abdominal area.   -AL         Subjective Pain    Able to rate subjective pain? yes  -AL      Pre-Treatment Pain Level 0  -AL      Post-Treatment Pain Level 0  -AL        User Key  (r) = Recorded By, (t) = Taken By, (c) = Cosigned By    Initials Name Provider Type    AL Christinaeve Courtney PTA Physical Therapy Assistant                              PT OP Goals     Row Name 04/09/18 0805          PT Short Term Goals    STG 1 Patient and family will have a basic understanding of the condition of lymphedema and the care required for it.   -AL     STG 1 Progress Ongoing  -AL     STG 1 Progress Comments Improving  -AL     STG 2 Patient will be independent with basic HEP for lymphatic drainage for BLEs.  -AL     STG 2 Progress Ongoing  -AL     STG 2 Progress Comments Working on this about every other day.  -AL     STG 3 Patient and family will be independent with self massage at home for BLE lymphedema.  -AL     STG 3 Progress Ongoing  -AL     STG 3 Progress Comments Continues to work on the massage, working on this every other day.  -AL        Long  Term Goals    LTG 1 Patient and family will be independent with use of appropriate compression garments for BLE.  -AL     LTG 1 Progress Ongoing  -AL     LTG 1 Progress Comments She likes the compression hose she has now and is wearing them daily.  -AL     LTG 2 Patient will receive a home pump and be able to use this daily.  -AL     LTG 2 Progress New  -AL     LTG 3 If patient needs advanced pump, this will be addressed after basic pump failure.   -AL     LTG 3 Progress New  -AL     LTG 4 Patient will understand importance of following low fat, low sugar diet.  -AL     LTG 4 Progress Ongoing  -AL     LTG 4 Progress Comments She is trying to adjust her diet, this is a work in progress.  -AL        Time Calculation    PT Goal Re-Cert Due Date 04/18/18  -AL       User Key  (r) = Recorded By, (t) = Taken By, (c) = Cosigned By    Initials Name Provider Type    LEXIS Courtney PTA Physical Therapy Assistant          Therapy Education  Education Details: Conitnue with CDT  Given: Edema management  Program: Reinforced  How Provided: Verbal  Provided to: Patient  Level of Understanding: Verbalized              Time Calculation:   Start Time: 0805  Stop Time: 0915  Time Calculation (min): 70 min  Total Timed Code Minutes- PT: 70 minute(s)     Therapy Charges for Today     Code Description Service Date Service Provider Modifiers Qty    90411058577 HC PT MANUAL THERAPY EA 15 MIN 4/9/2018 Christina Courtney PTA GP 5                    Christina Courtney PTA  4/9/2018

## 2018-04-12 ENCOUNTER — HOSPITAL ENCOUNTER (OUTPATIENT)
Dept: PHYSICAL THERAPY | Facility: HOSPITAL | Age: 68
Setting detail: THERAPIES SERIES
Discharge: HOME | End: 2018-04-12

## 2018-04-12 DIAGNOSIS — I89.0 LYMPHEDEMA OF BOTH LOWER EXTREMITIES: Primary | ICD-10-CM

## 2018-04-12 PROCEDURE — 97140 MANUAL THERAPY 1/> REGIONS: CPT

## 2018-04-12 NOTE — THERAPY TREATMENT NOTE
Outpatient Physical Therapy Lymphedema Treatment Note  [facility]     Patient Name: Lina Valente  : 1950  MRN: [id number]  Today's Date: 2018        Visit Date: 2018    Visit Dx:    ICD-10-CM ICD-9-CM   1. Lymphedema of both lower extremities I89.0 457.1       Patient Active Problem List   Diagnosis   • Morbid obesity   • Acquired hypothyroidism   • Essential hypertension   • NAGY (dyspnea on exertion)   • Edema of both legs   • PAT (obstructive sleep apnea)              Lymphedema     Row Name 18 0810             Subjective Pain    Able to rate subjective pain? yes  -AL      Pre-Treatment Pain Level 0  -AL      Post-Treatment Pain Level 0  -AL         Subjective Comments    Subjective Comments She is working on the massage and exercises.  Her stomach is a little sore today she thinks from the exercises.   She is running a few minutes late today.  She was on her feet more yesterday, about 3 hours.  She wears her compression stockings every day, her daughter helps her put them on.  -AL         Lymphedema Measurements    Measurement Type(s) Circumferential  -AL      Circumferential Areas Lower extremities  -AL         LLE Circumferential (cm)    Measurement Location 1 base of toes  -AL      Left 1 21.9 cm  -AL      Measurement Location 2 +12  -AL      Left 2 29.6 cm  -AL      Measurement Location 3 +10  -AL      Left 3 31.8 cm  -AL      Measurement Location 4 +10  -AL      Left 4 42.1 cm  -AL      Measurement Location 5 +10  -AL      Left 5 45 cm  -AL      Measurement Location 6 +10  -AL      Left 6 57.1 cm  -AL         RLE Circumferential (cm)    Measurement Location 1 base of toes  -AL      Right 1 22 cm  -AL      Measurement Location 2 +12  -AL      Right 2 27.7 cm  -AL      Measurement Location 3 +10  -AL      Right 3 32.9 cm  -AL      Measurement Location 4 +10  -AL      Right 4 43 cm  -AL      Measurement Location 5 +10  -AL      Right 5 45.4 cm  -AL      Measurement Location 6  +10  -AL      Right 6 56.5 cm  -AL         Manual Lymphatic Drainage    Manual Lymphatic Drainage initial sequence;opened regional lymph nodes;opened anastamoses;extremity treatment  -AL      Initial Sequence short neck  -AL      Abdomen superficial  -AL      Diaphragmatic Breathing x 9 with superficial abdominals  -AL      Opened Regional Lymph Nodes axillary;inguinal  -AL      Axillary right;left  -AL      Inguinal right;left  -AL      Opened Anastamoses inguino-axillary  -AL      Inguino-Axillary right;left  -AL      Extremity Treatment MLD to full limb  -AL      MLD to Full Limb B LE  -AL      Manual Lymphatic Drainage Comments Spent extra time on MLD to both ankles  -AL         Compression/Skin Care    Compression/Skin Care compression garment  -AL      Skin Care lotion applied   applied lotion early in treatment   -AL      Compression Garment Comments Applied the large knee high compression stockings.  -AL        User Key  (r) = Recorded By, (t) = Taken By, (c) = Cosigned By    Initials Name Provider Type    LEXIS Courtney PTA Physical Therapy Assistant                              PT Assessment/Plan     Row Name 04/12/18 0894          PT Assessment    Assessment Comments Patient has had an increase in swelling in the L ankle, the R lower leg, and at both knees.  She was on her feet more yesterday than she normally is.  She is compliant with the MLD, HEP, and compression.  She would benefit from a compression pump for the LE's to help move the fluid out of the legs.   -AL        PT Plan    PT Plan Comments Continue with complete decongestive therapy.   -AL       User Key  (r) = Recorded By, (t) = Taken By, (c) = Cosigned By    Initials Name Provider Type    LEXIS Courtney PTA Physical Therapy Assistant                     Exercises     Row Name 04/12/18 9612             Subjective Comments    Subjective Comments She is working on the massage and exercises.  Her stomach is a little sore today she thinks  from the exercises.   She is running a few minutes late today.  She was on her feet more yesterday, about 3 hours.  She wears her compression stockings every day, her daughter helps her put them on.  -AL         Subjective Pain    Able to rate subjective pain? yes  -AL      Pre-Treatment Pain Level 0  -AL      Post-Treatment Pain Level 0  -AL        User Key  (r) = Recorded By, (t) = Taken By, (c) = Cosigned By    Initials Name Provider Type    LEXIS Courtney, PTA Physical Therapy Assistant                              PT OP Goals     Row Name 04/12/18 0810          PT Short Term Goals    STG 1 Patient and family will have a basic understanding of the condition of lymphedema and the care required for it.   -AL     STG 1 Progress Ongoing;Progressing  -AL     STG 1 Progress Comments Continues to improve  -AL     STG 2 Patient will be independent with basic HEP for lymphatic drainage for BLEs.  -AL     STG 2 Progress Ongoing;Progressing  -AL     STG 2 Progress Comments She is compliant with the HEP  -AL     STG 3 Patient and family will be independent with self massage at home for BLE lymphedema.  -AL     STG 3 Progress Ongoing;Progressing  -AL     STG 3 Progress Comments She is compliant with the MLD  -AL        Long Term Goals    LTG 1 Patient and family will be independent with use of appropriate compression garments for BLE.  -AL     LTG 1 Progress Ongoing;Progressing  -AL     LTG 1 Progress Comments Her daghter helps put the stockings on daily.  -AL     LTG 2 Patient will receive a home pump and be able to use this daily.  -AL     LTG 2 Progress New  -AL     LTG 3 If patient needs advanced pump, this will be addressed after basic pump failure.   -AL     LTG 3 Progress New  -AL     LTG 4 Patient will understand importance of following low fat, low sugar diet.  -AL     LTG 4 Progress Ongoing  -AL        Time Calculation    PT Goal Re-Cert Due Date 04/18/18  -AL       User Key  (r) = Recorded By, (t) = Taken By,  (c) = Cosigned By    Initials Name Provider Type    AL Christina Courtney PTA Physical Therapy Assistant          Therapy Education  Education Details: Continue with HEP, MLD, and compression for both legs.   Given: Edema management  Program: Reinforced  How Provided: Verbal  Provided to: Patient  Level of Understanding: Verbalized              Time Calculation:   Start Time: 0810  Stop Time: 0915  Time Calculation (min): 65 min  Total Timed Code Minutes- PT: 65 minute(s)     Therapy Charges for Today     Code Description Service Date Service Provider Modifiers Qty    73211263609 HC PT MANUAL THERAPY EA 15 MIN 4/12/2018 Christina Courtney PTA GP 4                    Christina Courtney PTA  4/12/2018

## 2018-04-16 ENCOUNTER — HOSPITAL ENCOUNTER (OUTPATIENT)
Dept: PHYSICAL THERAPY | Facility: HOSPITAL | Age: 68
Setting detail: THERAPIES SERIES
Discharge: HOME | End: 2018-04-16

## 2018-04-16 DIAGNOSIS — I89.0 LYMPHEDEMA OF BOTH LOWER EXTREMITIES: Primary | ICD-10-CM

## 2018-04-16 DIAGNOSIS — Q82.0 HEREDITARY LYMPHEDEMA OF LEGS: ICD-10-CM

## 2018-04-16 PROCEDURE — G8990 OTHER PT/OT CURRENT STATUS: HCPCS | Performed by: PHYSICAL THERAPIST

## 2018-04-16 PROCEDURE — G8991 OTHER PT/OT GOAL STATUS: HCPCS | Performed by: PHYSICAL THERAPIST

## 2018-04-16 PROCEDURE — 97140 MANUAL THERAPY 1/> REGIONS: CPT | Performed by: PHYSICAL THERAPIST

## 2018-04-16 NOTE — THERAPY PROGRESS REPORT/RE-CERT
Outpatient Physical Therapy Lymphedema Progress Note/G-code update  [facility]     Patient Name: Lina Valente  : 1950  MRN: [id number]  Today's Date: 2018        Visit Date: 2018    Visit Dx:    ICD-10-CM ICD-9-CM   1. Lymphedema of both lower extremities I89.0 457.1       Patient Active Problem List   Diagnosis   • Morbid obesity   • Acquired hypothyroidism   • Essential hypertension   • NAGY (dyspnea on exertion)   • Edema of both legs   • PAT (obstructive sleep apnea)              Lymphedema     Row Name 18 0900             Subjective Comments    Subjective Comments She said she had talked with Nohemy about the pump.   -HR         Lymphedema Measurements    Lymphedema Measurements Comments Abdomen at waist: 128 cm  -HR         Manual Lymphatic Drainage    Manual Lymphatic Drainage initial sequence;opened regional lymph nodes;opened anastamoses;extremity treatment  -HR      Initial Sequence short neck  -HR      Abdomen superficial  -HR      Opened Regional Lymph Nodes axillary;inguinal  -HR      Axillary right;left  -HR      Inguinal right;left  -HR      Opened Anastamoses inguino-axillary  -HR      Inguino-Axillary right;left  -HR      Extremity Treatment MLD to full limb  -HR      MLD to Full Limb BLE  -HR      Manual Lymphatic Drainage Comments Spent extra time on MLD to both ankles  -HR         Compression/Skin Care    Compression/Skin Care compression garment;skin care  -HR      Skin Care lotion applied  -HR      Compression Garment Comments After lotion dried, applied the knee high compression stockings she has been wearing.   -HR        User Key  (r) = Recorded By, (t) = Taken By, (c) = Cosigned By    Initials Name Provider Type    HR Mahsa Dubois, PT DPT Physical Therapist                              PT Assessment/Plan     Row Name 18 0900          PT Assessment    Assessment Comments She has continued to be compliant with her compression stockings daily, at  20-30 mmHg compression. She is working on the HEP and self massage as well. Her abdominal measurement was quite a bit larger today than last week. We will attempt to get her a home pump to begin using and see how she responds.   -HR        PT Plan    PT Frequency 2x/week;Other (comment)  -HR     Predicted Duration of Therapy Intervention (OT Eval) 1 more visit this week and then if she has pump, will have her use it daily and return in 4 weeks to reassess.   -HR     PT Plan Comments 1 more visit this week and then if she has pump, will have her use it daily and return in 4 weeks to reassess  -HR       User Key  (r) = Recorded By, (t) = Taken By, (c) = Cosigned By    Initials Name Provider Type    HR Mahsa Dubois, PT DPT Physical Therapist                     Exercises     Row Name 04/16/18 0900             Subjective Comments    Subjective Comments She said she had talked with Nohemy about the pump.   -HR         Subjective Pain    Able to rate subjective pain? yes  -HR      Pre-Treatment Pain Level 0  -HR      Post-Treatment Pain Level 0  -HR        User Key  (r) = Recorded By, (t) = Taken By, (c) = Cosigned By    Initials Name Provider Type    HR Mahsa Dubois, PT DPT Physical Therapist                              PT OP Goals     Row Name 04/16/18 0900          PT Short Term Goals    STG 1 Patient and family will have a basic understanding of the condition of lymphedema and the care required for it.   -HR     STG 1 Progress Ongoing;Progressing  -HR     STG 2 Patient will be independent with basic HEP for lymphatic drainage for BLEs.  -HR     STG 2 Progress Ongoing;Progressing  -HR     STG 3 Patient and family will be independent with self massage at home for BLE lymphedema.  -HR     STG 3 Progress Ongoing;Progressing  -HR        Long Term Goals    LTG 1 Patient and family will be independent with use of appropriate compression garments for BLE.  -HR     LTG 1 Progress Ongoing;Met  -HR     LTG 2  Patient will receive a home pump and be able to use this daily.  -HR     LTG 2 Progress Ongoing  -HR     LTG 2 Progress Comments Will send documentation to seek approval for a basic pump  -HR     LTG 3 If patient needs advanced pump, this will be addressed after basic pump failure.   -HR     LTG 3 Progress New  -HR     LTG 4 Patient will understand importance of following low fat, low sugar diet.  -HR     LTG 4 Progress Ongoing  -HR        Time Calculation    PT Goal Re-Cert Due Date 05/16/18  -HR       User Key  (r) = Recorded By, (t) = Taken By, (c) = Cosigned By    Initials Name Provider Type    HR Mahsa Dubois, PT DPT Physical Therapist          Therapy Education  Education Details: Continue maintenance program  Given: Edema management  Program: Reinforced  How Provided: Verbal  Provided to: Patient  Level of Understanding: Verbalized              Time Calculation:         Therapy Charges for Today     Code Description Service Date Service Provider Modifiers Qty    90011441382 HC PT OTHER PRIME FUNCT CURRENT 4/16/2018 Mahsa Dubois, PT DPT GP,  1    68929179753 HC PT OTHER PRIME FUNCT PROJECTED 4/16/2018 Mahsa Dubois, PT DPT GP,  1    [id number] HC PT MANUAL THERAPY EA 15 MIN 4/16/2018 Mahsa Dubois, PT DPT GP 4          PT G-Codes  Functional Limitation: Other PT primary  Other PT Primary Current Status (): At least 20 percent but less than 40 percent impaired, limited or restricted  Other PT Primary Goal Status (): At least 20 percent but less than 40 percent impaired, limited or restricted         Mahsa Dubois, PT DPT  4/16/2018

## 2018-04-19 ENCOUNTER — HOSPITAL ENCOUNTER (OUTPATIENT)
Dept: PHYSICAL THERAPY | Facility: HOSPITAL | Age: 68
Setting detail: THERAPIES SERIES
Discharge: HOME | End: 2018-04-19

## 2018-04-19 DIAGNOSIS — Q82.0 HEREDITARY LYMPHEDEMA OF LEGS: Primary | ICD-10-CM

## 2018-04-19 DIAGNOSIS — I89.0 LYMPHEDEMA OF BOTH LOWER EXTREMITIES: ICD-10-CM

## 2018-04-19 PROCEDURE — 97140 MANUAL THERAPY 1/> REGIONS: CPT

## 2018-04-19 NOTE — THERAPY TREATMENT NOTE
Outpatient Physical Therapy Lymphedema Treatment Note  [facility]     Patient Name: Lina Valente  : 1950  MRN: [id number]  Today's Date: 2018        Visit Date: 2018    Visit Dx:    ICD-10-CM ICD-9-CM   1. Hereditary lymphedema of legs Q82.0 757.0   2. Lymphedema of both lower extremities I89.0 457.1       Patient Active Problem List   Diagnosis   • Morbid obesity   • Acquired hypothyroidism   • Essential hypertension   • NAGY (dyspnea on exertion)   • Edema of both legs   • PAT (obstructive sleep apnea)                    Lymphedema     Row Name 18 0815             Subjective Pain    Able to rate subjective pain? yes  -AL      Pre-Treatment Pain Level 0  -AL      Post-Treatment Pain Level 0  -AL         Subjective Comments    Subjective Comments She is not having any pain today.  She feels like her abdomen is larger today.  Her daughter can also tell the abdomen is larger.   She is wearing her compression stockings daily, she is working on the HEP and  MLD.  She states her mother started having leg swelling after child birth.   -AL         Lymphedema Measurements    Measurement Type(s) Circumferential  -AL      Circumferential Areas Lower extremities  -AL      Lymphedema Measurements Comments Abdomen at waist: 130 cm  -AL         LLE Circumferential (cm)    Measurement Location 1 base of toes  -AL      Left 1 21.3 cm  -AL      Measurement Location 2 +12  -AL      Left 2 28.1 cm  -AL      Measurement Location 3 +10  -AL      Left 3 29.6 cm  -AL      Measurement Location 4 +10  -AL      Left 4 41.1 cm  -AL      Measurement Location 5 +10  -AL      Left 5 44.5 cm  -AL      Measurement Location 6 +10  -AL      Left 6 57.1 cm  -AL         RLE Circumferential (cm)    Measurement Location 1 base of toes  -AL      Right 1 21.7 cm  -AL      Measurement Location 2 +12  -AL      Right 2 26.7 cm  -AL      Measurement Location 3 +10  -AL      Right 3 28.7 cm  -AL      Measurement Location 4 +10   -AL      Right 4 42 cm  -AL      Measurement Location 5 +10  -AL      Right 5 43.2 cm  -AL      Measurement Location 6 +10  -AL      Right 6 54.6 cm  -AL         Manual Lymphatic Drainage    Manual Lymphatic Drainage initial sequence;opened regional lymph nodes;opened anastamoses;extremity treatment  -AL      Initial Sequence short neck  -AL      Abdomen superficial  -AL      Diaphragmatic Breathing x 9 with superficial abdominals  -AL      Opened Regional Lymph Nodes axillary;inguinal  -AL      Axillary right;left  -AL      Inguinal right;left  -AL      Opened Anastamoses inguino-axillary  -AL      Inguino-Axillary right;left  -AL      Extremity Treatment MLD to full limb  -AL      MLD to Full Limb BLE  -AL         Compression/Skin Care    Compression/Skin Care compression garment  -AL      Skin Care --  -AL      Compression Garment Comments Applied the large knee high compression stockings.  -AL        User Key  (r) = Recorded By, (t) = Taken By, (c) = Cosigned By    Initials Name Provider Type    LEXIS Courtney PTA Physical Therapy Assistant                              PT Assessment/Plan     Row Name 04/19/18 0815          PT Assessment    Assessment Comments Patient is compliant with her HEP and MLD.  She is wearing her compression stockings daily.  Patient has had an increase in abdominal fluid since last treatment earlier in the week.  Both patient and her daughter can tell her abdomen is larger.  Will continue to attempt to get a home pump for patient.  She would benefit from coming for treatment once a week to continue to work on moving the fluid out of both legs, but especially working on moving out the abdomial fluid since it has increased from a week ago and even and increase from earlier this week.   -AL        PT Plan    PT Plan Comments Will see patient one time a week for 4 weeks, will continue with plan of care.   -AL       User Key  (r) = Recorded By, (t) = Taken By, (c) = Cosigned By     Initials Name Provider Type    LEXIS CourtneyANIRUDH Physical Therapy Assistant                     Exercises     Row Name 04/19/18 0815             Subjective Comments    Subjective Comments She is not having any pain today.  She feels like her abdomen is larger today.  Her daughter can also tell the abdomen is larger.   She is wearing her compression stockings daily, she is working on the HEP and  MLD.  She states her mother started having leg swelling after child birth.   -AL         Subjective Pain    Able to rate subjective pain? yes  -AL      Pre-Treatment Pain Level 0  -AL      Post-Treatment Pain Level 0  -AL        User Key  (r) = Recorded By, (t) = Taken By, (c) = Cosigned By    Initials Name Provider Type    LEXIS Courtney ANIRUDH Physical Therapy Assistant                              PT OP Goals     Row Name 04/19/18 0815          PT Short Term Goals    STG 1 Patient and family will have a basic understanding of the condition of lymphedema and the care required for it.   -AL     STG 1 Progress Ongoing;Progressing  -AL     STG 1 Progress Comments Patient and daughter's understanding has improved since starting treatments.  -AL     STG 2 Patient will be independent with basic HEP for lymphatic drainage for BLEs.  -AL     STG 2 Progress Ongoing;Progressing  -AL     STG 2 Progress Comments She is compliant with her HEP  -AL     STG 3 Patient and family will be independent with self massage at home for BLE lymphedema.  -AL     STG 3 Progress Ongoing;Progressing  -AL     STG 3 Progress Comments She is compliant with the MLD  -AL        Long Term Goals    LTG 1 Patient and family will be independent with use of appropriate compression garments for BLE.  -AL     LTG 1 Progress Ongoing;Met  -AL     LTG 1 Progress Comments She is wearing compression daily.  -AL     LTG 2 Patient will receive a home pump and be able to use this daily.  -AL     LTG 2 Progress Ongoing  -AL     LTG 2 Progress Comments Documentation  has been sent to recieve approval for the pump.  -AL     LTG 3 If patient needs advanced pump, this will be addressed after basic pump failure.   -AL     LTG 3 Progress New  -AL     LTG 4 Patient will understand importance of following low fat, low sugar diet.  -AL     LTG 4 Progress Ongoing  -AL     LTG 4 Progress Comments She knows the importance of a low fat low salt diet.   -AL        Time Calculation    PT Goal Re-Cert Due Date 05/16/18  -AL       User Key  (r) = Recorded By, (t) = Taken By, (c) = Cosigned By    Initials Name Provider Type    AL Christina Courtney PTA Physical Therapy Assistant          Therapy Education  Education Details: Continue to work on HEP, MLD, and wear compression daily.  Given: Edema management  Program: Reinforced  How Provided: Verbal  Provided to: Patient, Caregiver (Daughter)  Level of Understanding: Verbalized              Time Calculation:   Start Time: 0815  Stop Time: 0917  Time Calculation (min): 62 min  Total Timed Code Minutes- PT: 62 minute(s)     Therapy Charges for Today     Code Description Service Date Service Provider Modifiers Qty    37160062620 HC PT MANUAL THERAPY EA 15 MIN 4/19/2018 Christina Courtney PTA GP 4                                                  Therapy Education  Education Details: Continue to work on HEP, MLD, and wear compression daily.  Given: Edema management  Program: Reinforced  How Provided: Verbal  Provided to: Patient, Caregiver (Daughter)  Level of Understanding: Verbalized              Time Calculation:   Start Time: 0815  Stop Time: 0917  Time Calculation (min): 62 min  Total Timed Code Minutes- PT: 62 minute(s)                   Christina Courtney PTA  4/18/2018

## 2018-04-25 ENCOUNTER — HOSPITAL ENCOUNTER (OUTPATIENT)
Dept: PHYSICAL THERAPY | Facility: HOSPITAL | Age: 68
Setting detail: THERAPIES SERIES
Discharge: HOME | End: 2018-04-25

## 2018-04-25 DIAGNOSIS — I89.0 LYMPHEDEMA OF BOTH LOWER EXTREMITIES: ICD-10-CM

## 2018-04-25 DIAGNOSIS — Q82.0 HEREDITARY LYMPHEDEMA OF LEGS: Primary | ICD-10-CM

## 2018-04-25 PROCEDURE — 97140 MANUAL THERAPY 1/> REGIONS: CPT

## 2018-04-25 NOTE — THERAPY TREATMENT NOTE
Outpatient Physical Therapy Lymphedema Treatment Note  [facility]     Patient Name: Lina Valente  : 1950  MRN: [id number]  Today's Date: 2018        Visit Date: 2018    Visit Dx:    ICD-10-CM ICD-9-CM   1. Hereditary lymphedema of legs Q82.0 757.0   2. Lymphedema of both lower extremities I89.0 457.1       Patient Active Problem List   Diagnosis   • Morbid obesity   • Acquired hypothyroidism   • Essential hypertension   • NAGY (dyspnea on exertion)   • Edema of both legs   • PAT (obstructive sleep apnea)              Lymphedema     Row Name 18 0850             Subjective Pain    Able to rate subjective pain? yes  -AL      Pre-Treatment Pain Level 0  -AL      Post-Treatment Pain Level 0  -AL         Subjective Comments    Subjective Comments She has a stomach ache today.  She is working on the HEP and MLD about every other day.  -AL         Lymphedema Measurements    Measurement Type(s) Circumferential  -AL      Circumferential Areas Lower extremities  -AL         LLE Circumferential (cm)    Measurement Location 1 base of toes  -AL      Left 1 21.2 cm  -AL      Measurement Location 2 +12  -AL      Left 2 28.7 cm  -AL      Measurement Location 3 +10  -AL      Left 3 31.3 cm  -AL      Measurement Location 4 +10  -AL      Left 4 42.7 cm  -AL      Measurement Location 5 +10  -AL      Left 5 42.3 cm  -AL      Measurement Location 6 +10  -AL      Left 6 56 cm  -AL         RLE Circumferential (cm)    Measurement Location 1 base of toes  -AL      Right 1 21.5 cm  -AL      Measurement Location 2 +12  -AL      Right 2 26.5 cm  -AL      Measurement Location 3 +10  -AL      Right 3 30.9 cm  -AL      Measurement Location 4 +10  -AL      Right 4 42.2 cm  -AL      Measurement Location 5 +10  -AL      Right 5 43.4 cm  -AL      Measurement Location 6 +10  -AL      Right 6 55.1 cm  -AL         Manual Lymphatic Drainage    Manual Lymphatic Drainage initial sequence;opened regional lymph nodes;opened  anastamoses;extremity treatment  -AL      Initial Sequence short neck  -AL      Abdomen superficial  -AL      Diaphragmatic Breathing --   No abdominal treatment since patient has a stomach ache.  -AL      Opened Regional Lymph Nodes axillary;inguinal  -AL      Axillary right;left  -AL      Inguinal right;left  -AL      Opened Anastamoses inguino-axillary  -AL      Inguino-Axillary right;left  -AL      Extremity Treatment MLD to full limb  -AL      MLD to Full Limb BLE  -AL         Compression/Skin Care    Compression/Skin Care compression garment  -AL      Skin Care lotion applied  -AL      Compression Garment Comments Applied the large knee high compression stockings.  -AL        User Key  (r) = Recorded By, (t) = Taken By, (c) = Cosigned By    Initials Name Provider Type    LEXIS Courtney PTA Physical Therapy Assistant                              PT Assessment/Plan     Row Name 04/25/18 9862          PT Assessment    Assessment Comments Patient is compliant with the HEP and MLD.  Patient's daughter goes by patient's house daily to put the compression stockings on.  She has not heard from the pump company yet.  -AL        PT Plan    PT Plan Comments Will continue to work complete decongestive therapy one time a week.   -AL       User Key  (r) = Recorded By, (t) = Taken By, (c) = Cosigned By    Initials Name Provider Type    LEXIS Courtney PTA Physical Therapy Assistant                     Exercises     Row Name 04/25/18 5146             Subjective Comments    Subjective Comments She has a stomach ache today.  She is working on the HEP and MLD about every other day.  -AL         Subjective Pain    Able to rate subjective pain? yes  -AL      Pre-Treatment Pain Level 0  -AL      Post-Treatment Pain Level 0  -AL        User Key  (r) = Recorded By, (t) = Taken By, (c) = Cosigned By    Initials Name Provider Type    LEXIS Courtney PTA Physical Therapy Assistant                              PT OP Goals     Row  Name 04/25/18 0850          PT Short Term Goals    STG 1 Patient and family will have a basic understanding of the condition of lymphedema and the care required for it.   -AL     STG 1 Progress Ongoing;Progressing;Partially Met  -AL     STG 1 Progress Comments She and her family have a better understanding of patient's lymphedema an how to care for it.  -AL     STG 2 Patient will be independent with basic HEP for lymphatic drainage for BLEs.  -AL     STG 2 Progress Ongoing;Progressing  -AL     STG 2 Progress Comments She is working on the HEP every other day  -AL     STG 3 Patient and family will be independent with self massage at home for BLE lymphedema.  -AL     STG 3 Progress Ongoing;Progressing  -AL     STG 3 Progress Comments She is working on the MLD every other day  -AL        Long Term Goals    LTG 1 Patient and family will be independent with use of appropriate compression garments for BLE.  -AL     LTG 1 Progress Ongoing;Met  -AL     LTG 2 Patient will receive a home pump and be able to use this daily.  -AL     LTG 2 Progress Ongoing  -AL     LTG 2 Progress Comments Waiting to hear from the pump company.  -AL     LTG 3 If patient needs advanced pump, this will be addressed after basic pump failure.   -AL     LTG 3 Progress New  -AL     LTG 4 Patient will understand importance of following low fat, low sugar diet.  -AL     LTG 4 Progress Ongoing  -AL     LTG 4 Progress Comments She was educated about a low fat low salt diet again today  -AL        Time Calculation    PT Goal Re-Cert Due Date 05/16/18  -AL       User Key  (r) = Recorded By, (t) = Taken By, (c) = Cosigned By    Initials Name Provider Type    LEXIS Courtney PTA Physical Therapy Assistant          Therapy Education  Education Details: Continue with complete decongestive therapy.  Given: Edema management  Program: Reinforced  How Provided: Verbal  Provided to: Patient  Level of Understanding: Verbalized              Time Calculation:   Start  Time: 0850  Stop Time: 1000  Time Calculation (min): 70 min  Total Timed Code Minutes- PT: 70 minute(s)     Therapy Charges for Today     Code Description Service Date Service Provider Modifiers Qty    96431669758 HC PT MANUAL THERAPY EA 15 MIN 4/25/2018 Christina Courtney, PTA GP 5                    Christina Courtney, ANIRUDH  4/25/2018

## 2018-05-03 ENCOUNTER — HOSPITAL ENCOUNTER (OUTPATIENT)
Dept: PHYSICAL THERAPY | Facility: HOSPITAL | Age: 68
Setting detail: THERAPIES SERIES
Discharge: HOME | End: 2018-05-03

## 2018-05-03 DIAGNOSIS — I89.0 LYMPHEDEMA OF BOTH LOWER EXTREMITIES: ICD-10-CM

## 2018-05-03 DIAGNOSIS — Q82.0 HEREDITARY LYMPHEDEMA OF LEGS: Primary | ICD-10-CM

## 2018-05-03 PROCEDURE — 97140 MANUAL THERAPY 1/> REGIONS: CPT

## 2018-05-03 NOTE — THERAPY TREATMENT NOTE
Outpatient Physical Therapy Lymphedema Treatment Note  [facility]     Patient Name: Lina Valente  : 1950  MRN: [id number]  Today's Date: 5/3/2018        Visit Date: 2018    Visit Dx:    ICD-10-CM ICD-9-CM   1. Hereditary lymphedema of legs Q82.0 757.0   2. Lymphedema of both lower extremities I89.0 457.1       Patient Active Problem List   Diagnosis   • Morbid obesity   • Acquired hypothyroidism   • Essential hypertension   • NAGY (dyspnea on exertion)   • Edema of both legs   • PAT (obstructive sleep apnea)              Lymphedema     Row Name 18 0845             Subjective Pain    Able to rate subjective pain? yes  -AL      Pre-Treatment Pain Level 0  -AL      Post-Treatment Pain Level 0  -AL         Subjective Comments    Subjective Comments She is compliant with the HEP, MLD, and compression.  She is not having any pain today.  Her daughter takes her walking about once a week right now.   Her daughter's plan is to increase her walking since it is nicer outside.   -AL         Lymphedema Measurements    Measurement Type(s) Circumferential  -AL      Circumferential Areas Lower extremities  -AL         LLE Circumferential (cm)    Measurement Location 1 base of toes  -AL      Left 1 21.8 cm  -AL      Measurement Location 2 +12  -AL      Left 2 30.5 cm   29.7 cm after pump  -AL      Measurement Location 3 +10  -AL      Left 3 34.1 cm  -AL      Measurement Location 4 +10  -AL      Left 4 44.9 cm   43 cm after pump  -AL      Measurement Location 5 +10  -AL      Left 5 43.2 cm  -AL      Measurement Location 6 +10  -AL      Left 6 58.4 cm  -AL         RLE Circumferential (cm)    Measurement Location 1 base of toes  -AL      Right 1 21.6 cm  -AL      Measurement Location 2 +12  -AL      Right 2 28.9 cm  -AL      Measurement Location 3 +10  -AL      Right 3 31.4 cm  -AL      Measurement Location 4 +10  -AL      Right 4 43.4 cm  -AL      Measurement Location 5 +10  -AL      Right 5 45.4 cm  -AL       Measurement Location 6 +10  -AL      Right 6 54.9 cm  -AL         Manual Lymphatic Drainage    Manual Lymphatic Drainage initial sequence;opened regional lymph nodes;opened anastamoses;extremity treatment  -AL      Initial Sequence short neck  -AL      Abdomen superficial  -AL      Diaphragmatic Breathing x 9 with superficial abdominals  -AL      Opened Regional Lymph Nodes axillary;inguinal  -AL      Axillary right;left  -AL      Inguinal right;left  -AL      Opened Anastamoses inguino-axillary  -AL      Inguino-Axillary right;left  -AL      Extremity Treatment MLD to full limb  -AL      MLD to Full Limb BLE  -AL      Manual Lymphatic Drainage Comments Jonnytent had the basic pump demo today as well.  -AL         Compression/Skin Care    Compression/Skin Care compression garment  -AL      Skin Care --  -AL      Compression Garment Comments Applied the large knee high compression stockings.  -AL        User Key  (r) = Recorded By, (t) = Taken By, (c) = Cosigned By    Initials Name Provider Type    LEXIS Courtney PTA Physical Therapy Assistant                              PT Assessment/Plan     Row Name 05/03/18 5185          PT Assessment    Assessment Comments Patient had the pump demo today.  She has had an increase in size in both Lower extremities today, L > R.  Both ankles are visibly larger today.  She had the pump demo on the L LE, will see how she tolerates the pump today.    She is to continue with CDT.  -AL        PT Plan    PT Plan Comments Will continue with POC.   -AL       User Key  (r) = Recorded By, (t) = Taken By, (c) = Cosigned By    Initials Name Provider Type    LEXIS Courtney PTA Physical Therapy Assistant                     Exercises     Row Name 05/03/18 0888             Subjective Comments    Subjective Comments She is compliant with the HEP, MLD, and compression.  She is not having any pain today.  Her daughter takes her walking about once a week right now.   Her daughter's plan is  to increase her walking since it is nicer outside.   -AL         Subjective Pain    Able to rate subjective pain? yes  -AL      Pre-Treatment Pain Level 0  -AL      Post-Treatment Pain Level 0  -AL        User Key  (r) = Recorded By, (t) = Taken By, (c) = Cosigned By    Initials Name Provider Type    LEXIS Courtney PTA Physical Therapy Assistant                              PT OP Goals     Row Name 05/03/18 0845          PT Short Term Goals    STG 1 Patient and family will have a basic understanding of the condition of lymphedema and the care required for it.   -AL     STG 1 Progress Ongoing;Progressing;Partially Met  -AL     STG 1 Progress Comments Continues to improve  -AL     STG 2 Patient will be independent with basic HEP for lymphatic drainage for BLEs.  -AL     STG 2 Progress Ongoing;Progressing  -AL     STG 2 Progress Comments She is compliant with the HEP  -AL     STG 3 Patient and family will be independent with self massage at home for BLE lymphedema.  -AL     STG 3 Progress Ongoing;Progressing  -AL     STG 3 Progress Comments She is compliant with the MLD  -AL        Long Term Goals    LTG 1 Patient and family will be independent with use of appropriate compression garments for BLE.  -AL     LTG 1 Progress Ongoing;Met  -AL     LTG 2 Patient will receive a home pump and be able to use this daily.  -AL     LTG 2 Progress Ongoing  -AL     LTG 2 Progress Comments She had the basic pump demo today  -AL     LTG 3 If patient needs advanced pump, this will be addressed after basic pump failure.   -AL     LTG 3 Progress New  -AL     LTG 4 Patient will understand importance of following low fat, low sugar diet.  -AL     LTG 4 Progress Ongoing  -AL     LTG 4 Progress Comments She knows she needs to work on low fat, low salt diet.   -AL        Time Calculation    PT Goal Re-Cert Due Date 05/16/18  -AL       User Key  (r) = Recorded By, (t) = Taken By, (c) = Cosigned By    Initials Name Provider Type    LEXIS Vasquez  JUAN Courtney PTA Physical Therapy Assistant          Therapy Education  Education Details: Continue with compression stockings, MLD, and HEP.  Given: Edema management  Program: Reinforced  How Provided: Verbal  Provided to: Patient, Caregiver  Level of Understanding: Verbalized              Time Calculation:   Start Time: 0845  Stop Time: 0945  Time Calculation (min): 60 min  Total Timed Code Minutes- PT: 60 minute(s)     Therapy Charges for Today     Code Description Service Date Service Provider Modifiers Qty    42111465030 HC PT MANUAL THERAPY EA 15 MIN 5/3/2018 Christina Courtney PTA GP 3    09532329531 HC PT THER SUPP EA 15 MIN 5/3/2018 Christina Courtney PTA GP 1                    Christina Courtney PTA  5/3/2018

## 2018-05-10 ENCOUNTER — HOSPITAL ENCOUNTER (OUTPATIENT)
Dept: PHYSICAL THERAPY | Facility: HOSPITAL | Age: 68
Setting detail: THERAPIES SERIES
Discharge: HOME | End: 2018-05-10

## 2018-05-10 DIAGNOSIS — I89.0 LYMPHEDEMA OF BOTH LOWER EXTREMITIES: ICD-10-CM

## 2018-05-10 DIAGNOSIS — Q82.0 HEREDITARY LYMPHEDEMA OF LEGS: Primary | ICD-10-CM

## 2018-05-10 PROCEDURE — 97140 MANUAL THERAPY 1/> REGIONS: CPT

## 2018-05-10 NOTE — THERAPY PROGRESS REPORT/RE-CERT
Outpatient Physical Therapy Lymphedema Progress Note  [facility]     Patient Name: Lina Valente  : 1950  MRN: [id number]  Today's Date: 5/10/2018        Visit Date: 05/10/2018    Visit Dx:    ICD-10-CM ICD-9-CM   1. Hereditary lymphedema of legs Q82.0 757.0   2. Lymphedema of both lower extremities I89.0 457.1       Patient Active Problem List   Diagnosis   • Morbid obesity   • Acquired hypothyroidism   • Essential hypertension   • NAGY (dyspnea on exertion)   • Edema of both legs   • PAT (obstructive sleep apnea)              Lymphedema     Row Name 05/10/18 0839             Subjective Pain    Able to rate subjective pain? yes  -AL      Pre-Treatment Pain Level 0  -AL      Post-Treatment Pain Level 0  -AL         Subjective Comments    Subjective Comments She has been walking more.  She does not have any pain, but at times she will get short of breath.  She feels like this has improved as compared to a couple of months ago.  She is wearing her compression, working on her MLD and HEP.  Patient was running late today.   -AL         Lymphedema Measurements    Measurement Type(s) Circumferential  -AL      Circumferential Areas Lower extremities  -AL         LLE Circumferential (cm)    Measurement Location 1 base of toes  -AL      Left 1 21.7 cm  -AL      Measurement Location 2 +12  -AL      Left 2 30.4 cm  -AL      Measurement Location 3 +10  -AL      Left 3 34.6 cm  -AL      Measurement Location 4 +10  -AL      Left 4 44 cm  -AL      Measurement Location 5 +10  -AL      Left 5 41.9 cm  -AL      Measurement Location 6 +10  -AL      Left 6 57 cm  -AL         RLE Circumferential (cm)    Right 1 21.8 cm  -AL      Right 2 28 cm  -AL      Right 3 33.7 cm  -AL      Right 4 43.6 cm  -AL      Right 5 43 cm  -AL      Right 6 54.6 cm  -AL         Manual Lymphatic Drainage    Manual Lymphatic Drainage initial sequence;opened regional lymph nodes;opened anastamoses;extremity treatment  -AL      Initial Sequence  short neck  -AL      Abdomen superficial  -AL      Opened Regional Lymph Nodes axillary;inguinal  -AL      Axillary right;left  -AL      Inguinal right;left  -AL      Opened Anastamoses inguino-axillary  -AL      Inguino-Axillary right;left  -AL      Extremity Treatment MLD to full limb  -AL      MLD to Full Limb BLE  -AL      Manual Lymphatic Drainage Comments Pateint has not heard anything from the pump company yet.   -AL         Compression/Skin Care    Compression/Skin Care compression garment  -AL      Skin Care lotion applied   applied lotion and beginning of treatment.  -AL      Compression Garment Comments Applied the large knee high compression stockings to both lower legs  -AL        User Key  (r) = Recorded By, (t) = Taken By, (c) = Cosigned By    Initials Name Provider Type    LEXIS Courtney PTA Physical Therapy Assistant                              PT Assessment/Plan     Row Name 05/10/18 0839          PT Assessment    Assessment Comments She has areas in both lower legs that have decreased in size, and a few areas that have slightly increased.  She is working on complete decongestive therapy.  Patient has not heard anything about the pump at this time.  We will plan to continue to see patient once a week until she has the basic pump.  We will then see her after she has used the basic pump for four weeks to see if the advance pump is needed.    -AL        PT Plan    PT Frequency 1x/week  -AL     Predicted Duration of Therapy Intervention (OT Eval) 4 weeks  -AL     PT Plan Comments Will continue to see 1 x a week x 4 weeks until she gets her basic pump.   -AL       User Key  (r) = Recorded By, (t) = Taken By, (c) = Cosigned By    Initials Name Provider Type    LEXIS Courtney PTA Physical Therapy Assistant                     Exercises     Row Name 05/10/18 5915             Subjective Comments    Subjective Comments She has been walking more.  She does not have any pain, but at times she will get  short of breath.  She feels like this has improved as compared to a couple of months ago.  She is wearing her compression, working on her MLD and HEP.  Patient was running late today.   -AL         Subjective Pain    Able to rate subjective pain? yes  -AL      Pre-Treatment Pain Level 0  -AL      Post-Treatment Pain Level 0  -AL        User Key  (r) = Recorded By, (t) = Taken By, (c) = Cosigned By    Initials Name Provider Type    LEXIS Courtney PTA Physical Therapy Assistant                              PT OP Goals     Row Name 05/10/18 0839          PT Short Term Goals    STG 1 Patient and family will have a basic understanding of the condition of lymphedema and the care required for it.   -AL     STG 1 Progress Ongoing;Progressing;Partially Met  -AL     STG 1 Progress Comments Improving  -AL     STG 2 Patient will be independent with basic HEP for lymphatic drainage for BLEs.  -AL     STG 2 Progress Ongoing;Progressing  -AL     STG 2 Progress Comments Patient is working on her HEP  -AL     STG 3 Patient and family will be independent with self massage at home for BLE lymphedema.  -AL     STG 3 Progress Ongoing;Progressing  -AL     STG 3 Progress Comments Patient is working on the self MLD  -AL        Long Term Goals    LTG 1 Patient and family will be independent with use of appropriate compression garments for BLE.  -AL     LTG 1 Progress Ongoing;Met  -AL     LTG 1 Progress Comments She wears her compression daily  -AL     LTG 2 Patient will receive a home pump and be able to use this daily.  -AL     LTG 2 Progress Ongoing  -AL     LTG 2 Progress Comments She has had the basic pump demo, she does not have the machine yet.   -AL     LTG 3 If patient needs advanced pump, this will be addressed after basic pump failure.   -AL     LTG 3 Progress New  -AL     LTG 3 Progress Comments Will need to have the basic pump for 4 weeks before we can assess if she needs the advanced pump.  -AL     LTG 4 Patient will  understand importance of following low fat, low sugar diet.  -AL     LTG 4 Progress Ongoing  -AL     LTG 4 Progress Comments She has been working on her diet the past week.   -AL        Time Calculation    PT Goal Re-Cert Due Date 06/09/18  -AL       User Key  (r) = Recorded By, (t) = Taken By, (c) = Cosigned By    Initials Name Provider Type    LEXIS Courtney PTA Physical Therapy Assistant          Therapy Education  Education Details: Continue with complete decongestive therapy.   Given: Edema management  Program: Reinforced  How Provided: Verbal  Provided to: Patient  Level of Understanding: Verbalized              Time Calculation:   Start Time: 0839  Stop Time: 0944  Time Calculation (min): 65 min  Total Timed Code Minutes- PT: 65 minute(s)     Therapy Charges for Today     Code Description Service Date Service Provider Modifiers Qty    66853161616 HC PT MANUAL THERAPY EA 15 MIN 5/10/2018 Christina Courtney PTA GP 4                    Christina Courtney PTA  5/10/2018

## 2018-05-17 ENCOUNTER — HOSPITAL ENCOUNTER (OUTPATIENT)
Dept: PHYSICAL THERAPY | Facility: HOSPITAL | Age: 68
Setting detail: THERAPIES SERIES
Discharge: HOME | End: 2018-05-17

## 2018-05-17 DIAGNOSIS — Q82.0 HEREDITARY LYMPHEDEMA OF LEGS: Primary | ICD-10-CM

## 2018-05-17 DIAGNOSIS — I89.0 LYMPHEDEMA OF BOTH LOWER EXTREMITIES: ICD-10-CM

## 2018-05-17 PROCEDURE — 97140 MANUAL THERAPY 1/> REGIONS: CPT

## 2018-05-17 NOTE — THERAPY TREATMENT NOTE
Outpatient Physical Therapy Lymphedema Treatment Note  [facility]     Patient Name: Lina Valente  : 1950  MRN: [id number]  Today's Date: 2018        Visit Date: 2018    Visit Dx:    ICD-10-CM ICD-9-CM   1. Hereditary lymphedema of legs Q82.0 757.0   2. Lymphedema of both lower extremities I89.0 457.1       Patient Active Problem List   Diagnosis   • Morbid obesity   • Acquired hypothyroidism   • Essential hypertension   • NAGY (dyspnea on exertion)   • Edema of both legs   • PAT (obstructive sleep apnea)              Lymphedema     Row Name 18 0900             Subjective Pain    Able to rate subjective pain? yes  -AL      Pre-Treatment Pain Level 0  -AL      Post-Treatment Pain Level 0  -AL         Subjective Comments    Subjective Comments She has not heard from the pump company yet, she is working on the MLD, HEP, and wearing compression.  -AL         Lymphedema Measurements    Measurement Type(s) Circumferential  -AL      Circumferential Areas Lower extremities  -AL         LLE Circumferential (cm)    Measurement Location 1 base of toes  -AL      Left 1 21.4 cm  -AL      Measurement Location 2 +12  -AL      Left 2 30.1 cm  -AL      Measurement Location 3 +10  -AL      Left 3 31.2 cm  -AL      Measurement Location 4 +10  -AL      Left 4 42.4 cm  -AL      Measurement Location 5 +10  -AL      Left 5 43.6 cm  -AL      Measurement Location 6 +10  -AL      Left 6 56.1 cm  -AL         RLE Circumferential (cm)    Measurement Location 1 base of toes  -AL      Right 1 21.9 cm  -AL      Measurement Location 2 +12  -AL      Right 2 30.6 cm  -AL      Measurement Location 3 +10  -AL      Right 3 29.7 cm  -AL      Measurement Location 4 +10  -AL      Right 4 41.1 cm  -AL      Measurement Location 5 +10  -AL      Right 5 43.6 cm  -AL      Measurement Location 6 +10  -AL      Right 6 53.4 cm  -AL         Manual Lymphatic Drainage    Manual Lymphatic Drainage initial sequence;opened regional lymph  nodes;opened anastamoses;extremity treatment  -AL      Initial Sequence short neck  -AL      Abdomen superficial  -AL      Diaphragmatic Breathing x 9 with superficial abdominals  -AL      Opened Regional Lymph Nodes axillary;inguinal  -AL      Axillary right;left  -AL      Inguinal right;left  -AL      Opened Anastamoses inguino-axillary  -AL      Inguino-Axillary right;left  -AL      Extremity Treatment MLD to full limb  -AL      MLD to Full Limb BLE  -AL         Compression/Skin Care    Compression/Skin Care compression garment  -AL      Skin Care --  -AL      Compression Garment Comments Applied the large knee high compression stockings to both lower legs  -AL        User Key  (r) = Recorded By, (t) = Taken By, (c) = Cosigned By    Initials Name Provider Type    LEXIS Courtney PTA Physical Therapy Assistant                              PT Assessment/Plan     Row Name 05/17/18 0900          PT Assessment    Assessment Comments Patient is compliant with complete decongestive therapy.  She was running a little late today, then did not get checked into the lobby right away, but we were able to see her for 45 min.  Overall both lower legs are down in size today.  -AL        PT Plan    PT Plan Comments Will continue to see weekly until her pump is in.   -AL       User Key  (r) = Recorded By, (t) = Taken By, (c) = Cosigned By    Initials Name Provider Type    LEXIS Courtney PTA Physical Therapy Assistant                     Exercises     Row Name 05/17/18 0900             Subjective Comments    Subjective Comments She has not heard from the pump company yet, she is working on the MLD, HEP, and wearing compression.  -AL         Subjective Pain    Able to rate subjective pain? yes  -AL      Pre-Treatment Pain Level 0  -AL      Post-Treatment Pain Level 0  -AL        User Key  (r) = Recorded By, (t) = Taken By, (c) = Cosigned By    Initials Name Provider Type    LEXIS Courtney PTA Physical Therapy Assistant                               PT OP Goals     Row Name 05/17/18 0900          PT Short Term Goals    STG 1 Patient and family will have a basic understanding of the condition of lymphedema and the care required for it.   -AL     STG 1 Progress Ongoing;Progressing;Partially Met  -AL     STG 1 Progress Comments Continue to educate each treatment.   -AL     STG 2 Patient will be independent with basic HEP for lymphatic drainage for BLEs.  -AL     STG 2 Progress Ongoing;Progressing  -AL     STG 2 Progress Comments She is working on the HEP  -AL     STG 3 Patient and family will be independent with self massage at home for BLE lymphedema.  -AL     STG 3 Progress Ongoing;Progressing  -AL     STG 3 Progress Comments She is working on the MLD  -AL        Long Term Goals    LTG 1 Patient and family will be independent with use of appropriate compression garments for BLE.  -AL     LTG 1 Progress Ongoing;Met  -AL     LTG 2 Patient will receive a home pump and be able to use this daily.  -AL     LTG 2 Progress Ongoing  -AL     LTG 2 Progress Comments She is waiting to hear more from the pump company, she has had her deom of the basic pump  -AL     LTG 3 If patient needs advanced pump, this will be addressed after basic pump failure.   -AL     LTG 3 Progress New  -AL     LTG 4 Patient will understand importance of following low fat, low sugar diet.  -AL     LTG 4 Progress Ongoing  -AL        Time Calculation    PT Goal Re-Cert Due Date 06/09/18  -AL       User Key  (r) = Recorded By, (t) = Taken By, (c) = Cosigned By    Initials Name Provider Type    LEXIS Courtney PTA Physical Therapy Assistant          Therapy Education  Education Details: Continue with CDT, try to walk more.   Given: Edema management  Program: Reinforced  How Provided: Verbal  Provided to: Patient  Level of Understanding: Verbalized              Time Calculation:   Start Time: 0900  Stop Time: 0945  Time Calculation (min): 45 min  Total Timed Code Minutes- PT:  45 minute(s)     Therapy Charges for Today     Code Description Service Date Service Provider Modifiers Qty    36706707683 HC PT MANUAL THERAPY EA 15 MIN 5/17/2018 Christina Courtney, PTA GP 3                    Christina Courtney PTA  5/17/2018

## 2018-05-24 ENCOUNTER — APPOINTMENT (OUTPATIENT)
Dept: PHYSICAL THERAPY | Facility: HOSPITAL | Age: 68
End: 2018-05-24

## 2018-05-31 ENCOUNTER — APPOINTMENT (OUTPATIENT)
Dept: PHYSICAL THERAPY | Facility: HOSPITAL | Age: 68
End: 2018-05-31

## 2018-07-09 ENCOUNTER — HOSPITAL ENCOUNTER (OUTPATIENT)
Dept: HOSPITAL 58 - RAD | Age: 68
Discharge: HOME | End: 2018-07-09
Attending: INTERNAL MEDICINE
Payer: COMMERCIAL

## 2018-07-09 DIAGNOSIS — Z12.31: Primary | ICD-10-CM

## 2018-07-09 PROCEDURE — 77067 SCR MAMMO BI INCL CAD: CPT

## 2018-12-17 ENCOUNTER — DOCUMENTATION (OUTPATIENT)
Dept: PHYSICAL THERAPY | Facility: HOSPITAL | Age: 68
End: 2018-12-17

## 2018-12-17 DIAGNOSIS — Q82.0 HEREDITARY LYMPHEDEMA OF LEGS: Primary | ICD-10-CM

## 2018-12-17 DIAGNOSIS — I89.0 LYMPHEDEMA OF BOTH LOWER EXTREMITIES: ICD-10-CM

## 2018-12-17 NOTE — THERAPY DISCHARGE NOTE
Outpatient Physical Therapy Lymphedema Treatment Note/Discharge Summary       Patient Name: Lina Valente  : 1950  MRN: [id number]  Today's Date: 2018      Visit Date: 2018    Visit Dx:    ICD-10-CM ICD-9-CM   1. Hereditary lymphedema of legs Q82.0 757.0   2. Lymphedema of both lower extremities I89.0 457.1       Patient Active Problem List   Diagnosis   • Morbid obesity (CMS/HCC)   • Acquired hypothyroidism   • Essential hypertension   • NAGY (dyspnea on exertion)   • Edema of both legs   • PAT (obstructive sleep apnea)                                                     PT OP Goals     Row Name 18 1339          PT Short Term Goals    STG 1  Patient and family will have a basic understanding of the condition of lymphedema and the care required for it.   -AL     STG 1 Progress  Met  -AL     STG 1 Progress Comments  Had a basic under standing of her lymphedema and how to care for it.  -AL     STG 2  Patient will be independent with basic HEP for lymphatic drainage for BLEs.  -AL     STG 2 Progress  Met  -AL     STG 2 Progress Comments  Compliant with HEP  -AL     STG 3  Patient and family will be independent with self massage at home for BLE lymphedema.  -AL     STG 3 Progress  Met  -AL     STG 3 Progress Comments  Compliant with MLD  -AL        Long Term Goals    LTG 1  Patient and family will be independent with use of appropriate compression garments for BLE.  -AL     LTG 1 Progress  Met  -AL     LTG 1 Progress Comments  Compliant with daily use of compression.  -AL     LTG 2  Patient will receive a home pump and be able to use this daily.  -AL     LTG 2 Progress  Partially Met  -AL     LTG 2 Progress Comments  She had called an cancelled her appointments stating she had the pump and was going to start using this.    -AL     LTG 3  If patient needs advanced pump, this will be addressed after basic pump failure.   -AL     LTG 3 Progress  Not Met  -AL     LTG 3 Progress Comments   Patient did not call for more appointments.  -AL     LTG 4  Patient will understand importance of following low fat, low sugar diet.  -AL     LTG 4 Progress  Met  -AL     LTG 4 Progress Comments  She was working on a low fat low sugar diet.  -AL       User Key  (r) = Recorded By, (t) = Taken By, (c) = Cosigned By    Initials Name Provider Type    Christina Espinosa, PTA Physical Therapy Assistant                         Time Calculation:       Therapy Suggested Charges     Code   Minutes Charges    None                      OP PT Discharge Summary  Date of Discharge: 12/17/18  Reason for Discharge: Maximum functional potential achieved  Outcomes Achieved: Patient able to partially acheive established goals  Discharge Destination: Home with home program  Discharge Instructions/Additional Comments: Patient was compliant with CDT.  She had called and stated she had the pump and would start using it.  She did not call back to schedule more appointments, we will d/c this patient.       Christina Courtney, ANIRUDH  12/17/2018

## 2019-02-11 ENCOUNTER — TELEPHONE (OUTPATIENT)
Dept: VASCULAR SURGERY | Facility: CLINIC | Age: 69
End: 2019-02-11

## 2019-02-11 NOTE — TELEPHONE ENCOUNTER
Spoke with Mrs Valente reminding her of her appointment for Tuesday, February 12th, 2019 at 9 am with Dr Thakkar. Mrs Valente stated she wasn't going to be able to make it and wanted to just wait awhile before rescheduling.

## 2019-05-21 ENCOUNTER — HOSPITAL ENCOUNTER (OUTPATIENT)
Dept: HOSPITAL 58 - CAR | Age: 69
Discharge: HOME | End: 2019-05-21
Attending: INTERNAL MEDICINE

## 2019-05-21 DIAGNOSIS — I10: ICD-10-CM

## 2019-05-21 DIAGNOSIS — R06.02: Primary | ICD-10-CM

## 2019-05-21 PROCEDURE — 94761 N-INVAS EAR/PLS OXIMETRY MLT: CPT

## 2019-05-24 ENCOUNTER — HOSPITAL ENCOUNTER (OUTPATIENT)
Dept: HOSPITAL 58 - RAD | Age: 69
Discharge: HOME | End: 2019-05-24
Attending: INTERNAL MEDICINE

## 2019-05-24 DIAGNOSIS — R91.1: Primary | ICD-10-CM

## 2019-05-24 PROCEDURE — 82565 ASSAY OF CREATININE: CPT

## 2019-05-24 PROCEDURE — 36415 COLL VENOUS BLD VENIPUNCTURE: CPT
